# Patient Record
Sex: FEMALE | Race: WHITE | NOT HISPANIC OR LATINO | Employment: PART TIME | URBAN - METROPOLITAN AREA
[De-identification: names, ages, dates, MRNs, and addresses within clinical notes are randomized per-mention and may not be internally consistent; named-entity substitution may affect disease eponyms.]

---

## 2023-07-29 ENCOUNTER — HOSPITAL ENCOUNTER (OUTPATIENT)
Dept: DATA CONVERSION | Facility: HOSPITAL | Age: 33
Discharge: HOME | End: 2023-07-29
Attending: EMERGENCY MEDICINE

## 2023-07-29 DIAGNOSIS — R11.0 NAUSEA: ICD-10-CM

## 2023-07-29 DIAGNOSIS — Z88.2 ALLERGY STATUS TO SULFONAMIDES: ICD-10-CM

## 2023-07-29 DIAGNOSIS — I10 ESSENTIAL (PRIMARY) HYPERTENSION: ICD-10-CM

## 2023-07-29 DIAGNOSIS — R10.11 RIGHT UPPER QUADRANT PAIN: Primary | ICD-10-CM

## 2023-07-29 DIAGNOSIS — R00.0 TACHYCARDIA, UNSPECIFIED: ICD-10-CM

## 2023-07-29 LAB
ALBUMIN SERPL-MCNC: 3.8 GM/DL (ref 3.5–5)
ALBUMIN/GLOB SERPL: 1.2 RATIO (ref 1.5–3)
ALP BLD-CCNC: 112 U/L (ref 35–125)
ALT SERPL-CCNC: 21 U/L (ref 5–40)
ANION GAP SERPL CALCULATED.3IONS-SCNC: 10 MMOL/L (ref 0–19)
AST SERPL-CCNC: 18 U/L (ref 5–40)
BACTERIA SPEC CULT: NORMAL
BACTERIA UR QL AUTO: NEGATIVE
BASOPHILS # BLD AUTO: 0.07 K/UL (ref 0–0.22)
BASOPHILS NFR BLD AUTO: 0.6 % (ref 0–1)
BILIRUB SERPL-MCNC: 0.6 MG/DL (ref 0.1–1.2)
BILIRUB UR QL STRIP.AUTO: NEGATIVE
BUN SERPL-MCNC: 7 MG/DL (ref 8–25)
BUN/CREAT SERPL: 8.8 RATIO (ref 8–21)
CALCIUM SERPL-MCNC: 8.8 MG/DL (ref 8.5–10.4)
CC # UR: NORMAL /UL
CHLORIDE SERPL-SCNC: 102 MMOL/L (ref 97–107)
CLARITY UR: CLEAR
CO2 SERPL-SCNC: 27 MMOL/L (ref 24–31)
COLOR UR: ABNORMAL
CREAT SERPL-MCNC: 0.8 MG/DL (ref 0.4–1.6)
DEPRECATED RDW RBC AUTO: 42.5 FL (ref 37–54)
DIFFERENTIAL METHOD BLD: ABNORMAL
EOSINOPHIL # BLD AUTO: 0.25 K/UL (ref 0–0.45)
EOSINOPHIL NFR BLD: 2 % (ref 0–3)
ERYTHROCYTE [DISTWIDTH] IN BLOOD BY AUTOMATED COUNT: 12.5 % (ref 11.7–15)
GFR SERPL CREATININE-BSD FRML MDRD: 100 ML/MIN/1.73 M2
GLOBULIN SER-MCNC: 3.3 G/DL (ref 1.9–3.7)
GLUCOSE SERPL-MCNC: 108 MG/DL (ref 65–99)
GLUCOSE UR STRIP.AUTO-MCNC: NEGATIVE MG/DL
HCG UR QL: NEGATIVE
HCT VFR BLD AUTO: 40.2 % (ref 36–44)
HGB BLD-MCNC: 13.4 GM/DL (ref 12–15)
HGB UR QL STRIP.AUTO: ABNORMAL /HPF (ref 0–3)
HGB UR QL: NEGATIVE
HYALINE CASTS UR QL AUTO: 3 /LPF
IMM GRANULOCYTES # BLD AUTO: 0.07 K/UL (ref 0–0.1)
KETONES UR QL STRIP.AUTO: NEGATIVE
LEUKOCYTE ESTERASE UR QL STRIP.AUTO: ABNORMAL
LIPASE SERPL-CCNC: 19 U/L (ref 16–63)
LYMPHOCYTES # BLD AUTO: 2.89 K/UL (ref 1.2–3.2)
LYMPHOCYTES NFR BLD MANUAL: 23 % (ref 20–40)
MCH RBC QN AUTO: 31 PG (ref 26–34)
MCHC RBC AUTO-ENTMCNC: 33.3 % (ref 31–37)
MCV RBC AUTO: 93.1 FL (ref 80–100)
MICROSCOPIC (UA): ABNORMAL
MONOCYTES # BLD AUTO: 0.79 K/UL (ref 0–0.8)
MONOCYTES NFR BLD MANUAL: 6.3 % (ref 0–8)
NEUTROPHILS # BLD AUTO: 8.5 K/UL
NEUTROPHILS # BLD AUTO: 8.5 K/UL (ref 1.8–7.7)
NEUTROPHILS.IMMATURE NFR BLD: 0.6 % (ref 0–1)
NEUTS SEG NFR BLD: 67.5 % (ref 50–70)
NITRITE UR QL STRIP.AUTO: NEGATIVE
NRBC BLD-RTO: 0 /100 WBC
PH UR STRIP.AUTO: 6 [PH] (ref 4.6–8)
PLATELET # BLD AUTO: 358 K/UL (ref 150–450)
PMV BLD AUTO: 11 CU (ref 7–12.6)
POTASSIUM SERPL-SCNC: 4.2 MMOL/L (ref 3.4–5.1)
PROT SERPL-MCNC: 7.1 G/DL (ref 5.9–7.9)
PROT UR STRIP.AUTO-MCNC: NEGATIVE MG/DL
RBC # BLD AUTO: 4.32 M/UL (ref 4–4.9)
REPORT STATUS -LH SQ DATA CONVERSION: NORMAL
SERVICE CMNT-IMP: NORMAL
SODIUM SERPL-SCNC: 139 MMOL/L (ref 133–145)
SP GR UR STRIP.AUTO: 1.02 (ref 1–1.03)
SPECIMEN SOURCE: NORMAL
SQUAMOUS UR QL AUTO: ABNORMAL /HPF
URINE CULTURE: ABNORMAL
UROBILINOGEN UR QL STRIP.AUTO: NORMAL MG/DL (ref 0–1)
WBC # BLD AUTO: 12.6 K/UL (ref 4.5–11)
WBC #/AREA URNS AUTO: 3 /HPF (ref 0–3)

## 2023-10-30 PROCEDURE — 88141 CYTOPATH C/V INTERPRET: CPT | Performed by: PATHOLOGY

## 2023-10-30 PROCEDURE — 88175 CYTOPATH C/V AUTO FLUID REDO: CPT

## 2023-10-30 PROCEDURE — 87624 HPV HI-RISK TYP POOLED RSLT: CPT

## 2023-11-01 ENCOUNTER — LAB REQUISITION (OUTPATIENT)
Dept: LAB | Facility: HOSPITAL | Age: 33
End: 2023-11-01
Payer: COMMERCIAL

## 2023-11-01 ENCOUNTER — HOSPITAL ENCOUNTER (OUTPATIENT)
Dept: RADIOLOGY | Facility: HOSPITAL | Age: 33
Discharge: HOME | End: 2023-11-01
Payer: COMMERCIAL

## 2023-11-01 DIAGNOSIS — R87.810 CERVICAL HIGH RISK HUMAN PAPILLOMAVIRUS (HPV) DNA TEST POSITIVE: ICD-10-CM

## 2023-11-01 DIAGNOSIS — N92.6 IRREGULAR MENSTRUATION, UNSPECIFIED: ICD-10-CM

## 2023-11-01 DIAGNOSIS — Z01.419 ENCOUNTER FOR GYNECOLOGICAL EXAMINATION (GENERAL) (ROUTINE) WITHOUT ABNORMAL FINDINGS: ICD-10-CM

## 2023-11-01 DIAGNOSIS — Z87.42 PERSONAL HISTORY OF OTHER DISEASES OF THE FEMALE GENITAL TRACT: ICD-10-CM

## 2023-11-01 DIAGNOSIS — Z12.4 ENCOUNTER FOR SCREENING FOR MALIGNANT NEOPLASM OF CERVIX: ICD-10-CM

## 2023-11-01 DIAGNOSIS — Z11.51 ENCOUNTER FOR SCREENING FOR HUMAN PAPILLOMAVIRUS (HPV): ICD-10-CM

## 2023-11-01 DIAGNOSIS — R87.612 LOW GRADE SQUAMOUS INTRAEPITHELIAL LESION ON CYTOLOGIC SMEAR OF CERVIX (LGSIL): ICD-10-CM

## 2023-11-01 PROCEDURE — 74740 X-RAY FEMALE GENITAL TRACT: CPT

## 2023-11-01 PROCEDURE — 58340 CATHETER FOR HYSTEROGRAPHY: CPT

## 2023-11-01 PROCEDURE — 2550000001 HC RX 255 CONTRASTS: Performed by: STUDENT IN AN ORGANIZED HEALTH CARE EDUCATION/TRAINING PROGRAM

## 2023-11-01 RX ADMIN — IOHEXOL 15 ML: 240 INJECTION, SOLUTION INTRATHECAL; INTRAVASCULAR; INTRAVENOUS; ORAL at 09:52

## 2023-11-10 ENCOUNTER — LAB (OUTPATIENT)
Dept: LAB | Facility: LAB | Age: 33
End: 2023-11-10
Payer: COMMERCIAL

## 2023-11-10 DIAGNOSIS — N92.6 IRREGULAR MENSTRUATION, UNSPECIFIED: Primary | ICD-10-CM

## 2023-11-10 LAB — TSH SERPL DL<=0.05 MIU/L-ACNC: 3 MIU/L (ref 0.27–4.2)

## 2023-11-10 PROCEDURE — 82627 DEHYDROEPIANDROSTERONE: CPT

## 2023-11-10 PROCEDURE — 36415 COLL VENOUS BLD VENIPUNCTURE: CPT

## 2023-11-10 PROCEDURE — 83001 ASSAY OF GONADOTROPIN (FSH): CPT

## 2023-11-10 PROCEDURE — 84144 ASSAY OF PROGESTERONE: CPT

## 2023-11-10 PROCEDURE — 83002 ASSAY OF GONADOTROPIN (LH): CPT

## 2023-11-10 PROCEDURE — 84443 ASSAY THYROID STIM HORMONE: CPT

## 2023-11-10 PROCEDURE — 84146 ASSAY OF PROLACTIN: CPT

## 2023-11-10 PROCEDURE — 84402 ASSAY OF FREE TESTOSTERONE: CPT

## 2023-11-10 PROCEDURE — 82670 ASSAY OF TOTAL ESTRADIOL: CPT

## 2023-11-11 LAB
DHEA-S SERPL-MCNC: 177 UG/DL (ref 12–379)
ESTRADIOL SERPL-MCNC: 43 PG/ML
FSH SERPL-ACNC: 7.4 IU/L
LH SERPL-ACNC: 15.2 IU/L
PROGEST SERPL-MCNC: 0.4 NG/ML
PROLACTIN SERPL-MCNC: 14.5 UG/L (ref 3–20)

## 2023-11-15 LAB
CYTOLOGY CMNT CVX/VAG CYTO-IMP: NORMAL
HPV HR 12 DNA GENITAL QL NAA+PROBE: POSITIVE
HPV HR GENOTYPES PNL CVX NAA+PROBE: POSITIVE
HPV16 DNA SPEC QL NAA+PROBE: NEGATIVE
HPV18 DNA SPEC QL NAA+PROBE: NEGATIVE
LAB AP HPV GENOTYPE QUESTION: YES
LAB AP HPV HR: NORMAL
LAB AP PREVIOUS ABNORMAL HISTORY: NORMAL
LABORATORY COMMENT REPORT: NORMAL
LMP START DATE: NORMAL
PATH REPORT.TOTAL CANCER: NORMAL

## 2023-11-16 LAB
TESTOSTERONE FREE (CHAN): 6.9 PG/ML (ref 0.1–6.4)
TESTOSTERONE,TOTAL,LC-MS/MS: 29 NG/DL (ref 2–45)

## 2023-12-06 ENCOUNTER — TELEMEDICINE (OUTPATIENT)
Dept: PRIMARY CARE | Facility: CLINIC | Age: 33
End: 2023-12-06
Payer: COMMERCIAL

## 2023-12-06 VITALS — WEIGHT: 227 LBS | BODY MASS INDEX: 42.86 KG/M2 | HEIGHT: 61 IN

## 2023-12-06 DIAGNOSIS — U07.1 POSITIVE SELF-ADMINISTERED ANTIGEN TEST FOR COVID-19: Primary | ICD-10-CM

## 2023-12-06 PROCEDURE — 99213 OFFICE O/P EST LOW 20 MIN: CPT

## 2023-12-06 RX ORDER — TETRACYCLINE HYDROCHLORIDE 500 MG/1
500 CAPSULE ORAL 4 TIMES DAILY
COMMUNITY
Start: 2023-10-03 | End: 2023-12-06 | Stop reason: ALTCHOICE

## 2023-12-06 RX ORDER — CYCLOBENZAPRINE HCL 5 MG
5 TABLET ORAL EVERY 24 HOURS
COMMUNITY
Start: 2023-07-28 | End: 2023-12-06 | Stop reason: ALTCHOICE

## 2023-12-06 RX ORDER — FAMOTIDINE 20 MG/1
20 TABLET, FILM COATED ORAL DAILY
COMMUNITY
End: 2023-12-06 | Stop reason: ALTCHOICE

## 2023-12-06 RX ORDER — ONDANSETRON 4 MG/1
4 TABLET, ORALLY DISINTEGRATING ORAL EVERY 8 HOURS PRN
COMMUNITY

## 2023-12-06 RX ORDER — PHENAZOPYRIDINE HYDROCHLORIDE 200 MG/1
200 TABLET, FILM COATED ORAL 3 TIMES DAILY PRN
COMMUNITY
Start: 2023-07-14 | End: 2023-12-06 | Stop reason: ALTCHOICE

## 2023-12-06 RX ORDER — UBROGEPANT 50 MG/1
50 TABLET ORAL EVERY 24 HOURS
COMMUNITY
Start: 2021-07-27

## 2023-12-06 RX ORDER — NIRMATRELVIR AND RITONAVIR 300-100 MG
3 KIT ORAL 2 TIMES DAILY
Qty: 30 TABLET | Refills: 0 | Status: SHIPPED | OUTPATIENT
Start: 2023-12-06 | End: 2023-12-11

## 2023-12-06 RX ORDER — TRIAMCINOLONE ACETONIDE 1 MG/G
1 OINTMENT TOPICAL EVERY 12 HOURS
COMMUNITY
Start: 2021-11-05

## 2023-12-06 RX ORDER — TAMSULOSIN HYDROCHLORIDE 0.4 MG/1
1 CAPSULE ORAL EVERY 24 HOURS
COMMUNITY
Start: 2023-07-17 | End: 2023-12-06 | Stop reason: ALTCHOICE

## 2023-12-06 RX ORDER — AMLODIPINE BESYLATE 5 MG/1
5 TABLET ORAL DAILY
COMMUNITY

## 2023-12-06 RX ORDER — HYDROXYZINE HYDROCHLORIDE 10 MG/1
10 TABLET, FILM COATED ORAL AS NEEDED
COMMUNITY

## 2023-12-06 RX ORDER — OMEPRAZOLE 40 MG/1
40 CAPSULE, DELAYED RELEASE ORAL 2 TIMES DAILY
COMMUNITY
Start: 2023-11-01 | End: 2023-12-01 | Stop reason: WASHOUT

## 2023-12-06 ASSESSMENT — ENCOUNTER SYMPTOMS
NAUSEA: 0
CHILLS: 0
FATIGUE: 1
FEVER: 0
WHEEZING: 1
COUGH: 1
SHORTNESS OF BREATH: 0
MYALGIAS: 0
SINUS PRESSURE: 0
DIARRHEA: 0
SINUS PAIN: 0
SORE THROAT: 1
RHINORRHEA: 0

## 2023-12-06 ASSESSMENT — PATIENT HEALTH QUESTIONNAIRE - PHQ9
SUM OF ALL RESPONSES TO PHQ9 QUESTIONS 1 AND 2: 0
2. FEELING DOWN, DEPRESSED OR HOPELESS: NOT AT ALL
1. LITTLE INTEREST OR PLEASURE IN DOING THINGS: NOT AT ALL

## 2023-12-06 ASSESSMENT — PAIN SCALES - GENERAL: PAINLEVEL: 0-NO PAIN

## 2023-12-06 NOTE — PROGRESS NOTES
"Subjective   Patient ID: Sandrita Brady is a 33 y.o. female who presents for No chief complaint on file..    With patient's permission, this is a Telemedicine visit with video and audio. All issues as documented below were discussed and addressed but limited physical exam was performed. If it was felt that the patient should be evaluated via face-to-face office appointment(s) they were directed there.       Hx of HTN, JEANETTE, migraines, GERD    +Covid test 12/2/23, symptoms started 12/1/2023. Symptoms include cough, chest pain, sneezing, fatigue, no taste/smell, scratchy throat, mild wheezing, nasal congestion.  (7/2023). Denies fever, chills, ear pain, sinus pressure, rhinorrhea, SOB, diarrhea, nausea, myalgias.          Review of Systems   Constitutional:  Positive for fatigue. Negative for chills and fever.   HENT:  Positive for congestion, sneezing and sore throat. Negative for ear pain, rhinorrhea, sinus pressure and sinus pain.    Respiratory:  Positive for cough and wheezing. Negative for shortness of breath.    Cardiovascular:  Positive for chest pain.   Gastrointestinal:  Negative for diarrhea and nausea.   Musculoskeletal:  Negative for myalgias.       Objective   Ht 1.549 m (5' 1\")   Wt 103 kg (227 lb)   BMI 42.89 kg/m²     Physical Exam  Constitutional:       Appearance: Normal appearance.   Pulmonary:      Effort: Pulmonary effort is normal.   Skin:     Findings: No rash.   Neurological:      Mental Status: She is alert.   Psychiatric:         Mood and Affect: Mood and affect normal.         Assessment/Plan   Diagnoses and all orders for this visit:  Positive self-administered antigen test for COVID-19  -Cut Amlodipine in half while on Paxlovid. Patient currently not taking Ulbrevy., informed not to take within 24 hours of taking Paxlovid.      "

## 2023-12-06 NOTE — LETTER
December 6, 2023     Patient: Sandrita Brady   YOB: 1990   Date of Visit: 12/6/2023       To Whom It May Concern:    Sandrita Brady was seen in my clinic on 12/6/2023 at 8:30 am. Please excuse Sandrita for her absence from 12/02/2023 to 12/08/2023 due to acute illness. Can return to work on 12/9/2023.    If you have any questions or concerns, please don't hesitate to call.         Sincerely,     Bri Cagle PA-C

## 2023-12-13 PROCEDURE — 88305 TISSUE EXAM BY PATHOLOGIST: CPT

## 2023-12-13 PROCEDURE — 88305 TISSUE EXAM BY PATHOLOGIST: CPT | Performed by: PATHOLOGY

## 2023-12-14 ENCOUNTER — LAB REQUISITION (OUTPATIENT)
Dept: LAB | Facility: HOSPITAL | Age: 33
End: 2023-12-14
Payer: COMMERCIAL

## 2023-12-14 DIAGNOSIS — R87.612 LOW GRADE SQUAMOUS INTRAEPITHELIAL LESION ON CYTOLOGIC SMEAR OF CERVIX (LGSIL): ICD-10-CM

## 2023-12-15 LAB
LABORATORY COMMENT REPORT: NORMAL
PATH REPORT.FINAL DX SPEC: NORMAL
PATH REPORT.GROSS SPEC: NORMAL
PATH REPORT.RELEVANT HX SPEC: NORMAL
PATH REPORT.TOTAL CANCER: NORMAL

## 2024-02-26 ASSESSMENT — LIFESTYLE VARIABLES
HISTORY_ALCOHOL_USE: YES
TOBACCO_USE: NO

## 2024-03-07 ENCOUNTER — CONSULT (OUTPATIENT)
Dept: ENDOCRINOLOGY | Facility: CLINIC | Age: 34
End: 2024-03-07
Payer: COMMERCIAL

## 2024-03-07 ENCOUNTER — ANCILLARY PROCEDURE (OUTPATIENT)
Dept: ENDOCRINOLOGY | Facility: CLINIC | Age: 34
End: 2024-03-07
Payer: COMMERCIAL

## 2024-03-07 VITALS
BODY MASS INDEX: 43.8 KG/M2 | WEIGHT: 232 LBS | DIASTOLIC BLOOD PRESSURE: 89 MMHG | HEIGHT: 61 IN | SYSTOLIC BLOOD PRESSURE: 133 MMHG | TEMPERATURE: 98.6 F | HEART RATE: 80 BPM

## 2024-03-07 DIAGNOSIS — N93.9 ABNORMAL UTERINE BLEEDING (AUB): ICD-10-CM

## 2024-03-07 DIAGNOSIS — R10.2 PELVIC PAIN: Primary | ICD-10-CM

## 2024-03-07 DIAGNOSIS — N70.11 HYDROSALPINX: ICD-10-CM

## 2024-03-07 DIAGNOSIS — E28.2 PCOS (POLYCYSTIC OVARIAN SYNDROME): ICD-10-CM

## 2024-03-07 DIAGNOSIS — R10.2 PELVIC PAIN: ICD-10-CM

## 2024-03-07 PROCEDURE — 83520 IMMUNOASSAY QUANT NOS NONAB: CPT | Performed by: OBSTETRICS & GYNECOLOGY

## 2024-03-07 PROCEDURE — 83516 IMMUNOASSAY NONANTIBODY: CPT

## 2024-03-07 PROCEDURE — 36415 COLL VENOUS BLD VENIPUNCTURE: CPT | Performed by: OBSTETRICS & GYNECOLOGY

## 2024-03-07 PROCEDURE — 76830 TRANSVAGINAL US NON-OB: CPT

## 2024-03-07 PROCEDURE — 99205 OFFICE O/P NEW HI 60 MIN: CPT | Performed by: OBSTETRICS & GYNECOLOGY

## 2024-03-07 PROCEDURE — 99215 OFFICE O/P EST HI 40 MIN: CPT | Performed by: OBSTETRICS & GYNECOLOGY

## 2024-03-07 PROCEDURE — 36415 COLL VENOUS BLD VENIPUNCTURE: CPT

## 2024-03-07 RX ORDER — OMEPRAZOLE 40 MG/1
40 CAPSULE, DELAYED RELEASE ORAL
COMMUNITY

## 2024-03-07 RX ORDER — NIFEDIPINE 10 MG/1
10 CAPSULE ORAL 3 TIMES DAILY
COMMUNITY

## 2024-03-07 ASSESSMENT — PAIN SCALES - GENERAL: PAINLEVEL: 2

## 2024-03-07 ASSESSMENT — COLUMBIA-SUICIDE SEVERITY RATING SCALE - C-SSRS
2. HAVE YOU ACTUALLY HAD ANY THOUGHTS OF KILLING YOURSELF?: NO
1. IN THE PAST MONTH, HAVE YOU WISHED YOU WERE DEAD OR WISHED YOU COULD GO TO SLEEP AND NOT WAKE UP?: NO
6. HAVE YOU EVER DONE ANYTHING, STARTED TO DO ANYTHING, OR PREPARED TO DO ANYTHING TO END YOUR LIFE?: NO

## 2024-03-07 ASSESSMENT — PATIENT HEALTH QUESTIONNAIRE - PHQ9
2. FEELING DOWN, DEPRESSED OR HOPELESS: NOT AT ALL
1. LITTLE INTEREST OR PLEASURE IN DOING THINGS: NOT AT ALL
SUM OF ALL RESPONSES TO PHQ9 QUESTIONS 1 AND 2: 0

## 2024-03-07 NOTE — PROGRESS NOTES
Visit Type: In Person    GYN NOTE    2024    Consult from:  Dr Salguero, here today with long term partner Devyn    History of present illness: Sandrita Brady is a 33 y.o. female   who presents with concerns regarding hydrosalpinges. + AUB and pelvic pain, intermenstrual spotting.   Found out in 2018 that her tubes were blocked- she was with an ex at the time and had untreated CT, admitted with PID. She had IV abx and pain meds. Hospitalized for 4 days.   Prior to that she had irregular periods and was not sure whether endometriosis or PCOS. Her mother had endometriosis and had a hysterectomy. She couldn't take OCPs because they made her nauseated and contributed to weight gain.   She is an  and has some skin irritation - gained 70 lbs, wasn't able to keep weight gain.   She did have a TSH 4 years ago = 4.95. TSH 3 months ago was 3.   She does have some pelvic pain occasionally.     10.30.23  Narrative & Impression   Interpreted By:  Jonny Rankin,   STUDY:  [place] HYSTEROSALPINGOGRAM;  2023 9:44 am      INDICATION:  Signs/Symptoms:IRREGULAR PERIODS. 33-year-old woman with irregular  menstrual cycles, infertility workup.      COMPARISON:  CT abdomen and pelvis 2023      ACCESSION NUMBER(S):  [id number]      ORDERING CLINICIAN:  DEVIKA SALGUERO      TECHNIQUE:  Written informed consent was obtained from the patient after  discussion of the risks, benefits, and alternatives to this  procedure. Specifically, the risk of infection or endangerment of any  current pregnancy were discussed. The patient states she is on day 7  of her most recent menstrual cycle. An HSG was performed.  Approximately 10 mL of contrast was infused under fluoroscopic  guidance and spot views of the uterus and bilateral fallopian tubes  were obtained. The patient tolerated the procedure well.      Dose: 75.34 mGy air kerma  Images: 10      FINDINGS:  Contrast filled and unremarkable appearing uterus,  allowing for  impression of the balloon catheter. There is contrast opacification  of the bilateral fallopian tubes. Of note, there is left-sided  hydrosalpinx. It is difficult to discern for direct spillage of  contrast from the left fallopian tube. Possibly, there is a small  amount of contrast spillage around the left fallopian tube. Towards  the end of the study, there was suspicion for contrast opacification  of the left ovarian vein, denoted by tubular structure directed  superiorly.      Contrast fills the right fallopian tube, but there is no spillage  seen into the peritoneal cavity to suggest patency.      IMPRESSION:  1. Left-sided hydrosalpinx, making it difficult to discern for direct  spillage of contrast from the left fallopian tube. Possibly there is  a small amount of contrast spillage, but morphologically, the left  fallopian tube would be abnormal due to hydrosalpinx. Therefore,  ultrasound of the pelvis is recommended for further characterization.      2. No sign of contrast spillage from the right fallopian tube.  Therefore, right fallopian tube patency was not seen by HSG.       OBSTETRIC HISTORY     OB History          0    Para   0    Term   0       0    AB   0    Living   0         SAB   0    IAB   0    Ectopic   0    Multiple   0    Live Births   0                 MEDICAL HISTORY  History reviewed. No pertinent past medical history.    SURGICAL HISTORY   History reviewed. No pertinent surgical history.    SOCIAL HISTORY  Occupation:   Social History     Tobacco Use    Smoking status: Never     Passive exposure: Never    Smokeless tobacco: Never   Substance Use Topics    Alcohol use: Yes    Drug use: Never         MEDS  Current Outpatient Medications on File Prior to Visit   Medication Sig Dispense Refill    hydrOXYzine HCL (Atarax) 10 mg tablet Take 1 tablet (10 mg) by mouth if needed for anxiety.      NIFEdipine (Procardia) 10 mg capsule Take 1 capsule (10 mg) by  "mouth 3 times a day.      omeprazole (PriLOSEC) 40 mg DR capsule Take 1 capsule (40 mg) by mouth. Do not crush or chew.      triamcinolone (Kenalog) 0.1 % ointment Apply 1 Application topically every 12 hours.      ubrogepant (Ubrelvy) 50 mg tablet Take 1 tablet (50 mg) by mouth once every 24 hours.      amLODIPine (Norvasc) 5 mg tablet Take 1 tablet (5 mg) by mouth once daily.      ondansetron ODT (Zofran-ODT) 4 mg disintegrating tablet Take 1 tablet (4 mg) by mouth every 8 hours if needed for nausea or vomiting.       No current facility-administered medications on file prior to visit.       ALLERGIES  Albuterol, Codeine, Naproxen, and Sulfa (sulfonamide antibiotics)    Vitals: /89   Pulse 80   Temp 37 °C (98.6 °F)   Ht 1.549 m (5' 1\")   Wt 105 kg (232 lb)   LMP 02/05/2024 (Exact Date)   BMI 43.84 kg/m²   Physical Exam  General: NAD  CV: RRR  Lungs: CTAB  Abd: soft, NT, no masses  Pelvic: deferred  Ext: NT, no edema  US performed see results tab- positive sliding sign, no evidence of endo, left ovary appeared PCO    Prior labs:   CBC  Date: 7/29/2023  Plt: 358 (Ref range: 150 - 450 K/UL)  Hct: 40.2 (Ref range: 36 - 44 %)    CMP  Date: 7/29/2023   BUN: 7 (L; Ref range: 8 - 25 MG/DL)  Cre: 0.8 (Ref range: 0.4 - 1.6 MG/DL)  AST: 18 (Ref range: 5 - 40 U/L)  ALT: 21 (Ref range: 5 - 40 U/L)    Coagulation:  Date: No results found for requested labs within last 365 days.  PROTIME: No results found for requested labs within last 365 days.  INR: No results found for requested labs within last 365 days.  APTT: No results found for requested labs within last 365 days.    Last Pregnancy Test:  Date: No results found for requested labs within last 365 days.  No results found for requested labs within last 365 days.    Partner:  Tristian Ann  3.29.[birth year]  Together x 8 months (friends for 7 years)  PMH: None  PSH: None  No prior semen analysis  No pregnancy attempts previously   Not taking Testosterone  No family hx " (maternal aunt was infertile, maternal aunt with SABs)    Assessment  Sandrita Brady is a 33 y.o. female,   with hx PID and bilateral obstructed hydrosalpinx, pelvic pain, AUB    Plan  Counseling:   The patient's history and relevant imaging were discussed at length, and all questions were answered. The diagnosis of hydrosalpinx was discussed at length, and the patient understands that hydrosalpinx is typically the result of a prior inflammatory process that occurred within the pelvis (such as prior infection, prior surgery, or endometriosis).  The fertility impact of hydrosalpinx was discussed with the patient as well as the association of hydrosalpinges with a decreased pregnancy rate of up to 30-50%.  Therefore, surgical management of hydrosalpinx is recommended. If both fallopian tubes are removed, then IVF with embryo transfer is the only option for achieving pregnancy.   Orders Placed This Encounter   Procedures    US pelvis transvaginal    Antimullerian Hormone (Amh)     Orders placed for outpatient surgery. RBA discussed. She desires to proceed. We also discussed grants for future fertility.   Josey Benedict  2024  10:21 AM

## 2024-03-08 ENCOUNTER — PREP FOR PROCEDURE (OUTPATIENT)
Dept: ENDOCRINOLOGY | Facility: CLINIC | Age: 34
End: 2024-03-08
Payer: COMMERCIAL

## 2024-03-08 DIAGNOSIS — G89.29 CHRONIC PELVIC PAIN IN FEMALE: ICD-10-CM

## 2024-03-08 DIAGNOSIS — Z01.818 PRE-PROCEDURAL EXAMINATION: ICD-10-CM

## 2024-03-08 DIAGNOSIS — N93.9 ABNORMAL UTERINE BLEEDING (AUB): ICD-10-CM

## 2024-03-08 DIAGNOSIS — R10.2 CHRONIC PELVIC PAIN IN FEMALE: ICD-10-CM

## 2024-03-08 DIAGNOSIS — N70.11 HYDROSALPINX: Primary | ICD-10-CM

## 2024-03-08 RX ORDER — GABAPENTIN 600 MG/1
600 TABLET ORAL ONCE
Status: CANCELLED | OUTPATIENT
Start: 2024-03-08 | End: 2024-03-08

## 2024-03-08 RX ORDER — ACETAMINOPHEN 325 MG/1
975 TABLET ORAL ONCE
Status: CANCELLED | OUTPATIENT
Start: 2024-03-08 | End: 2024-03-08

## 2024-03-10 LAB — MIS SERPL-MCNC: 12.83 NG/ML (ref 0.18–11.71)

## 2024-03-11 PROBLEM — G89.29 CHRONIC PELVIC PAIN IN FEMALE: Status: ACTIVE | Noted: 2024-03-08

## 2024-03-11 PROBLEM — R10.2 CHRONIC PELVIC PAIN IN FEMALE: Status: ACTIVE | Noted: 2024-03-08

## 2024-03-11 PROBLEM — N93.9 ABNORMAL UTERINE BLEEDING (AUB): Status: ACTIVE | Noted: 2024-03-08

## 2024-03-11 PROBLEM — N70.11 HYDROSALPINX: Status: ACTIVE | Noted: 2024-03-08

## 2024-04-10 ENCOUNTER — APPOINTMENT (OUTPATIENT)
Dept: ENDOCRINOLOGY | Facility: CLINIC | Age: 34
End: 2024-04-10
Payer: COMMERCIAL

## 2024-04-17 ENCOUNTER — LAB (OUTPATIENT)
Dept: LAB | Facility: LAB | Age: 34
End: 2024-04-17
Payer: COMMERCIAL

## 2024-04-17 DIAGNOSIS — Z01.818 PRE-PROCEDURAL EXAMINATION: ICD-10-CM

## 2024-04-17 LAB
ABO GROUP (TYPE) IN BLOOD: NORMAL
ANION GAP SERPL CALC-SCNC: 10 MMOL/L (ref 10–20)
ANTIBODY SCREEN: NORMAL
BUN SERPL-MCNC: 11 MG/DL (ref 6–23)
CALCIUM SERPL-MCNC: 9.2 MG/DL (ref 8.6–10.3)
CHLORIDE SERPL-SCNC: 101 MMOL/L (ref 98–107)
CO2 SERPL-SCNC: 31 MMOL/L (ref 21–32)
CREAT SERPL-MCNC: 0.86 MG/DL (ref 0.5–1.05)
EGFRCR SERPLBLD CKD-EPI 2021: >90 ML/MIN/1.73M*2
ERYTHROCYTE [DISTWIDTH] IN BLOOD BY AUTOMATED COUNT: 12.3 % (ref 11.5–14.5)
GLUCOSE SERPL-MCNC: 102 MG/DL (ref 74–99)
HCG UR QL IA.RAPID: NEGATIVE
HCT VFR BLD AUTO: 40.5 % (ref 36–46)
HGB BLD-MCNC: 13.3 G/DL (ref 12–16)
MCH RBC QN AUTO: 29.2 PG (ref 26–34)
MCHC RBC AUTO-ENTMCNC: 32.8 G/DL (ref 32–36)
MCV RBC AUTO: 89 FL (ref 80–100)
NRBC BLD-RTO: 0 /100 WBCS (ref 0–0)
PLATELET # BLD AUTO: 296 X10*3/UL (ref 150–450)
POTASSIUM SERPL-SCNC: 4.3 MMOL/L (ref 3.5–5.3)
RBC # BLD AUTO: 4.56 X10*6/UL (ref 4–5.2)
RH FACTOR (ANTIGEN D): NORMAL
SODIUM SERPL-SCNC: 138 MMOL/L (ref 136–145)
WBC # BLD AUTO: 10.7 X10*3/UL (ref 4.4–11.3)

## 2024-04-17 PROCEDURE — 36415 COLL VENOUS BLD VENIPUNCTURE: CPT

## 2024-04-17 PROCEDURE — 81025 URINE PREGNANCY TEST: CPT

## 2024-04-17 PROCEDURE — 86900 BLOOD TYPING SEROLOGIC ABO: CPT

## 2024-04-17 PROCEDURE — 86901 BLOOD TYPING SEROLOGIC RH(D): CPT

## 2024-04-17 PROCEDURE — 86850 RBC ANTIBODY SCREEN: CPT

## 2024-04-17 PROCEDURE — 85027 COMPLETE CBC AUTOMATED: CPT

## 2024-04-17 PROCEDURE — 80048 BASIC METABOLIC PNL TOTAL CA: CPT

## 2024-04-19 ENCOUNTER — ANESTHESIA (OUTPATIENT)
Dept: OPERATING ROOM | Facility: HOSPITAL | Age: 34
End: 2024-04-19
Payer: COMMERCIAL

## 2024-04-19 ENCOUNTER — HOSPITAL ENCOUNTER (OUTPATIENT)
Facility: HOSPITAL | Age: 34
Setting detail: OUTPATIENT SURGERY
Discharge: HOME | End: 2024-04-19
Attending: OBSTETRICS & GYNECOLOGY | Admitting: OBSTETRICS & GYNECOLOGY
Payer: COMMERCIAL

## 2024-04-19 ENCOUNTER — ANESTHESIA EVENT (OUTPATIENT)
Dept: OPERATING ROOM | Facility: HOSPITAL | Age: 34
End: 2024-04-19
Payer: COMMERCIAL

## 2024-04-19 VITALS
OXYGEN SATURATION: 96 % | TEMPERATURE: 97.3 F | BODY MASS INDEX: 44.54 KG/M2 | DIASTOLIC BLOOD PRESSURE: 91 MMHG | WEIGHT: 235.89 LBS | SYSTOLIC BLOOD PRESSURE: 150 MMHG | RESPIRATION RATE: 16 BRPM | HEART RATE: 74 BPM | HEIGHT: 61 IN

## 2024-04-19 DIAGNOSIS — N93.9 ABNORMAL UTERINE BLEEDING (AUB): ICD-10-CM

## 2024-04-19 DIAGNOSIS — R10.2 CHRONIC PELVIC PAIN IN FEMALE: ICD-10-CM

## 2024-04-19 DIAGNOSIS — N70.11 HYDROSALPINX: ICD-10-CM

## 2024-04-19 DIAGNOSIS — Z98.890 POST-OPERATIVE STATE: Primary | ICD-10-CM

## 2024-04-19 DIAGNOSIS — G89.29 CHRONIC PELVIC PAIN IN FEMALE: ICD-10-CM

## 2024-04-19 LAB
ABO GROUP (TYPE) IN BLOOD: NORMAL
PREGNANCY TEST URINE, POC: NEGATIVE
RH FACTOR (ANTIGEN D): NORMAL

## 2024-04-19 PROCEDURE — 2500000005 HC RX 250 GENERAL PHARMACY W/O HCPCS: Performed by: OBSTETRICS & GYNECOLOGY

## 2024-04-19 PROCEDURE — 2500000001 HC RX 250 WO HCPCS SELF ADMINISTERED DRUGS (ALT 637 FOR MEDICARE OP): Performed by: STUDENT IN AN ORGANIZED HEALTH CARE EDUCATION/TRAINING PROGRAM

## 2024-04-19 PROCEDURE — A4216 STERILE WATER/SALINE, 10 ML: HCPCS | Performed by: OBSTETRICS & GYNECOLOGY

## 2024-04-19 PROCEDURE — A4217 STERILE WATER/SALINE, 500 ML: HCPCS | Performed by: OBSTETRICS & GYNECOLOGY

## 2024-04-19 PROCEDURE — 7100000010 HC PHASE TWO TIME - EACH INCREMENTAL 1 MINUTE: Performed by: OBSTETRICS & GYNECOLOGY

## 2024-04-19 PROCEDURE — 2500000005 HC RX 250 GENERAL PHARMACY W/O HCPCS: Performed by: STUDENT IN AN ORGANIZED HEALTH CARE EDUCATION/TRAINING PROGRAM

## 2024-04-19 PROCEDURE — A58660 PR LAP,LYSIS OF ADHESIONS: Performed by: STUDENT IN AN ORGANIZED HEALTH CARE EDUCATION/TRAINING PROGRAM

## 2024-04-19 PROCEDURE — 2500000005 HC RX 250 GENERAL PHARMACY W/O HCPCS: Performed by: ANESTHESIOLOGIST ASSISTANT

## 2024-04-19 PROCEDURE — 58350 REOPEN FALLOPIAN TUBE: CPT | Performed by: OBSTETRICS & GYNECOLOGY

## 2024-04-19 PROCEDURE — A58660 PR LAP,LYSIS OF ADHESIONS: Performed by: ANESTHESIOLOGIST ASSISTANT

## 2024-04-19 PROCEDURE — 7100000002 HC RECOVERY ROOM TIME - EACH INCREMENTAL 1 MINUTE: Performed by: OBSTETRICS & GYNECOLOGY

## 2024-04-19 PROCEDURE — 88305 TISSUE EXAM BY PATHOLOGIST: CPT | Mod: TC,AHULAB | Performed by: OBSTETRICS & GYNECOLOGY

## 2024-04-19 PROCEDURE — 2500000001 HC RX 250 WO HCPCS SELF ADMINISTERED DRUGS (ALT 637 FOR MEDICARE OP): Performed by: OBSTETRICS & GYNECOLOGY

## 2024-04-19 PROCEDURE — 7100000009 HC PHASE TWO TIME - INITIAL BASE CHARGE: Performed by: OBSTETRICS & GYNECOLOGY

## 2024-04-19 PROCEDURE — 3600000009 HC OR TIME - EACH INCREMENTAL 1 MINUTE - PROCEDURE LEVEL FOUR: Performed by: OBSTETRICS & GYNECOLOGY

## 2024-04-19 PROCEDURE — 58558 HYSTEROSCOPY BIOPSY: CPT | Performed by: OBSTETRICS & GYNECOLOGY

## 2024-04-19 PROCEDURE — 2500000004 HC RX 250 GENERAL PHARMACY W/ HCPCS (ALT 636 FOR OP/ED): Performed by: ANESTHESIOLOGIST ASSISTANT

## 2024-04-19 PROCEDURE — C1765 ADHESION BARRIER: HCPCS | Performed by: OBSTETRICS & GYNECOLOGY

## 2024-04-19 PROCEDURE — 2720000007 HC OR 272 NO HCPCS: Performed by: OBSTETRICS & GYNECOLOGY

## 2024-04-19 PROCEDURE — 88304 TISSUE EXAM BY PATHOLOGIST: CPT | Performed by: PATHOLOGY

## 2024-04-19 PROCEDURE — 3700000001 HC GENERAL ANESTHESIA TIME - INITIAL BASE CHARGE: Performed by: OBSTETRICS & GYNECOLOGY

## 2024-04-19 PROCEDURE — 2500000004 HC RX 250 GENERAL PHARMACY W/ HCPCS (ALT 636 FOR OP/ED): Performed by: OBSTETRICS & GYNECOLOGY

## 2024-04-19 PROCEDURE — 7100000001 HC RECOVERY ROOM TIME - INITIAL BASE CHARGE: Performed by: OBSTETRICS & GYNECOLOGY

## 2024-04-19 PROCEDURE — 58661 LAPAROSCOPY REMOVE ADNEXA: CPT | Performed by: OBSTETRICS & GYNECOLOGY

## 2024-04-19 PROCEDURE — 88305 TISSUE EXAM BY PATHOLOGIST: CPT | Performed by: PATHOLOGY

## 2024-04-19 PROCEDURE — 2780000003 HC OR 278 NO HCPCS: Performed by: OBSTETRICS & GYNECOLOGY

## 2024-04-19 PROCEDURE — 3600000004 HC OR TIME - INITIAL BASE CHARGE - PROCEDURE LEVEL FOUR: Performed by: OBSTETRICS & GYNECOLOGY

## 2024-04-19 PROCEDURE — 81025 URINE PREGNANCY TEST: CPT | Performed by: OBSTETRICS & GYNECOLOGY

## 2024-04-19 PROCEDURE — 36415 COLL VENOUS BLD VENIPUNCTURE: CPT | Performed by: OBSTETRICS & GYNECOLOGY

## 2024-04-19 PROCEDURE — 3700000002 HC GENERAL ANESTHESIA TIME - EACH INCREMENTAL 1 MINUTE: Performed by: OBSTETRICS & GYNECOLOGY

## 2024-04-19 RX ORDER — GABAPENTIN 300 MG/1
600 CAPSULE ORAL ONCE
Status: COMPLETED | OUTPATIENT
Start: 2024-04-19 | End: 2024-04-19

## 2024-04-19 RX ORDER — ROCURONIUM BROMIDE 10 MG/ML
INJECTION, SOLUTION INTRAVENOUS AS NEEDED
Status: DISCONTINUED | OUTPATIENT
Start: 2024-04-19 | End: 2024-04-19

## 2024-04-19 RX ORDER — OXYCODONE HYDROCHLORIDE 5 MG/1
5 TABLET ORAL EVERY 6 HOURS PRN
Qty: 10 TABLET | Refills: 0 | Status: SHIPPED | OUTPATIENT
Start: 2024-04-19

## 2024-04-19 RX ORDER — BUPIVACAINE HYDROCHLORIDE 2.5 MG/ML
INJECTION, SOLUTION INFILTRATION; PERINEURAL AS NEEDED
Status: DISCONTINUED | OUTPATIENT
Start: 2024-04-19 | End: 2024-04-19 | Stop reason: HOSPADM

## 2024-04-19 RX ORDER — DIPHENHYDRAMINE HYDROCHLORIDE 50 MG/ML
12.5 INJECTION INTRAMUSCULAR; INTRAVENOUS ONCE AS NEEDED
Status: DISCONTINUED | OUTPATIENT
Start: 2024-04-19 | End: 2024-04-19 | Stop reason: HOSPADM

## 2024-04-19 RX ORDER — SODIUM CHLORIDE, SODIUM LACTATE, POTASSIUM CHLORIDE, CALCIUM CHLORIDE 600; 310; 30; 20 MG/100ML; MG/100ML; MG/100ML; MG/100ML
INJECTION, SOLUTION INTRAVENOUS CONTINUOUS PRN
Status: DISCONTINUED | OUTPATIENT
Start: 2024-04-19 | End: 2024-04-19

## 2024-04-19 RX ORDER — MIDAZOLAM HYDROCHLORIDE 1 MG/ML
INJECTION INTRAMUSCULAR; INTRAVENOUS AS NEEDED
Status: DISCONTINUED | OUTPATIENT
Start: 2024-04-19 | End: 2024-04-19

## 2024-04-19 RX ORDER — METHOCARBAMOL 100 MG/ML
INJECTION, SOLUTION INTRAMUSCULAR; INTRAVENOUS AS NEEDED
Status: DISCONTINUED | OUTPATIENT
Start: 2024-04-19 | End: 2024-04-19

## 2024-04-19 RX ORDER — ONDANSETRON 4 MG/1
4 TABLET, FILM COATED ORAL EVERY 6 HOURS PRN
Qty: 20 TABLET | Refills: 0 | Status: SHIPPED | OUTPATIENT
Start: 2024-04-19

## 2024-04-19 RX ORDER — LABETALOL HYDROCHLORIDE 5 MG/ML
5 INJECTION, SOLUTION INTRAVENOUS ONCE AS NEEDED
Status: DISCONTINUED | OUTPATIENT
Start: 2024-04-19 | End: 2024-04-19 | Stop reason: HOSPADM

## 2024-04-19 RX ORDER — LIDOCAINE HYDROCHLORIDE 20 MG/ML
INJECTION, SOLUTION INFILTRATION; PERINEURAL AS NEEDED
Status: DISCONTINUED | OUTPATIENT
Start: 2024-04-19 | End: 2024-04-19

## 2024-04-19 RX ORDER — ONDANSETRON HYDROCHLORIDE 2 MG/ML
4 INJECTION, SOLUTION INTRAVENOUS ONCE AS NEEDED
Status: DISCONTINUED | OUTPATIENT
Start: 2024-04-19 | End: 2024-04-19 | Stop reason: HOSPADM

## 2024-04-19 RX ORDER — ONDANSETRON HYDROCHLORIDE 2 MG/ML
INJECTION, SOLUTION INTRAVENOUS AS NEEDED
Status: DISCONTINUED | OUTPATIENT
Start: 2024-04-19 | End: 2024-04-19

## 2024-04-19 RX ORDER — SODIUM CHLORIDE 0.9 G/100ML
IRRIGANT IRRIGATION AS NEEDED
Status: DISCONTINUED | OUTPATIENT
Start: 2024-04-19 | End: 2024-04-19 | Stop reason: HOSPADM

## 2024-04-19 RX ORDER — CEFAZOLIN 1 G/1
INJECTION, POWDER, FOR SOLUTION INTRAVENOUS AS NEEDED
Status: DISCONTINUED | OUTPATIENT
Start: 2024-04-19 | End: 2024-04-19

## 2024-04-19 RX ORDER — POLYETHYLENE GLYCOL 3350 17 G/17G
17 POWDER, FOR SOLUTION ORAL DAILY PRN
Qty: 10 PACKET | Refills: 0 | Status: SHIPPED | OUTPATIENT
Start: 2024-04-19

## 2024-04-19 RX ORDER — LIDOCAINE HYDROCHLORIDE 10 MG/ML
0.1 INJECTION, SOLUTION EPIDURAL; INFILTRATION; INTRACAUDAL; PERINEURAL ONCE
Status: DISCONTINUED | OUTPATIENT
Start: 2024-04-19 | End: 2024-04-19 | Stop reason: HOSPADM

## 2024-04-19 RX ORDER — FENTANYL CITRATE 50 UG/ML
INJECTION, SOLUTION INTRAMUSCULAR; INTRAVENOUS AS NEEDED
Status: DISCONTINUED | OUTPATIENT
Start: 2024-04-19 | End: 2024-04-19

## 2024-04-19 RX ORDER — ACETAMINOPHEN 325 MG/1
975 TABLET ORAL ONCE
Status: COMPLETED | OUTPATIENT
Start: 2024-04-19 | End: 2024-04-19

## 2024-04-19 RX ORDER — PROPOFOL 10 MG/ML
INJECTION, EMULSION INTRAVENOUS AS NEEDED
Status: DISCONTINUED | OUTPATIENT
Start: 2024-04-19 | End: 2024-04-19

## 2024-04-19 RX ORDER — ACETAMINOPHEN 325 MG/1
650 TABLET ORAL EVERY 6 HOURS PRN
Qty: 20 TABLET | Refills: 0 | Status: SHIPPED | OUTPATIENT
Start: 2024-04-19

## 2024-04-19 RX ORDER — SODIUM CHLORIDE, SODIUM LACTATE, POTASSIUM CHLORIDE, CALCIUM CHLORIDE 600; 310; 30; 20 MG/100ML; MG/100ML; MG/100ML; MG/100ML
100 INJECTION, SOLUTION INTRAVENOUS CONTINUOUS
Status: DISCONTINUED | OUTPATIENT
Start: 2024-04-19 | End: 2024-04-19 | Stop reason: HOSPADM

## 2024-04-19 RX ORDER — ONDANSETRON 4 MG/1
8 TABLET, ORALLY DISINTEGRATING ORAL ONCE
Status: COMPLETED | OUTPATIENT
Start: 2024-04-19 | End: 2024-04-19

## 2024-04-19 RX ORDER — IBUPROFEN 600 MG/1
600 TABLET ORAL EVERY 6 HOURS PRN
Qty: 20 TABLET | Refills: 0 | Status: SHIPPED | OUTPATIENT
Start: 2024-04-19

## 2024-04-19 RX ORDER — OXYCODONE HYDROCHLORIDE 5 MG/1
5 TABLET ORAL EVERY 4 HOURS PRN
Status: DISCONTINUED | OUTPATIENT
Start: 2024-04-19 | End: 2024-04-19 | Stop reason: HOSPADM

## 2024-04-19 RX ADMIN — DEXAMETHASONE SODIUM PHOSPHATE 4 MG: 4 INJECTION, SOLUTION INTRAMUSCULAR; INTRAVENOUS at 11:08

## 2024-04-19 RX ADMIN — FENTANYL CITRATE 50 MCG: 50 INJECTION, SOLUTION INTRAMUSCULAR; INTRAVENOUS at 09:00

## 2024-04-19 RX ADMIN — GABAPENTIN 600 MG: 300 CAPSULE ORAL at 07:02

## 2024-04-19 RX ADMIN — ACETAMINOPHEN 975 MG: 325 TABLET ORAL at 07:02

## 2024-04-19 RX ADMIN — SUGAMMADEX 200 MG: 100 INJECTION, SOLUTION INTRAVENOUS at 11:14

## 2024-04-19 RX ADMIN — LIDOCAINE HYDROCHLORIDE 100 MG: 20 INJECTION, SOLUTION INFILTRATION; PERINEURAL at 07:59

## 2024-04-19 RX ADMIN — ONDANSETRON 8 MG: 4 TABLET, ORALLY DISINTEGRATING ORAL at 13:14

## 2024-04-19 RX ADMIN — CEFAZOLIN 2 G: 1 INJECTION, POWDER, FOR SOLUTION INTRAMUSCULAR; INTRAVENOUS at 08:07

## 2024-04-19 RX ADMIN — OXYCODONE HYDROCHLORIDE 5 MG: 5 TABLET ORAL at 12:20

## 2024-04-19 RX ADMIN — Medication 20 MG: at 09:00

## 2024-04-19 RX ADMIN — ONDANSETRON 4 MG: 2 INJECTION INTRAMUSCULAR; INTRAVENOUS at 11:08

## 2024-04-19 RX ADMIN — SODIUM CHLORIDE, POTASSIUM CHLORIDE, SODIUM LACTATE AND CALCIUM CHLORIDE: 600; 310; 30; 20 INJECTION, SOLUTION INTRAVENOUS at 07:42

## 2024-04-19 RX ADMIN — PROPOFOL 200 MG: 10 INJECTION, EMULSION INTRAVENOUS at 07:59

## 2024-04-19 RX ADMIN — METHOCARBAMOL 1000 MG: 100 INJECTION, SOLUTION INTRAMUSCULAR; INTRAVENOUS at 11:08

## 2024-04-19 RX ADMIN — FENTANYL CITRATE 100 MCG: 50 INJECTION, SOLUTION INTRAMUSCULAR; INTRAVENOUS at 07:59

## 2024-04-19 RX ADMIN — Medication 30 MG: at 11:15

## 2024-04-19 RX ADMIN — MIDAZOLAM HYDROCHLORIDE 2 MG: 1 INJECTION, SOLUTION INTRAMUSCULAR; INTRAVENOUS at 07:39

## 2024-04-19 RX ADMIN — ROCURONIUM BROMIDE 30 MG: 10 INJECTION, SOLUTION INTRAVENOUS at 09:21

## 2024-04-19 RX ADMIN — ROCURONIUM BROMIDE 50 MG: 10 INJECTION, SOLUTION INTRAVENOUS at 08:50

## 2024-04-19 RX ADMIN — SODIUM CHLORIDE, SODIUM LACTATE, POTASSIUM CHLORIDE, CALCIUM CHLORIDE: 600; 310; 30; 20 INJECTION, SOLUTION INTRAVENOUS at 08:07

## 2024-04-19 RX ADMIN — ROCURONIUM BROMIDE 50 MG: 10 INJECTION, SOLUTION INTRAVENOUS at 07:59

## 2024-04-19 RX ADMIN — FENTANYL CITRATE 50 MCG: 50 INJECTION, SOLUTION INTRAMUSCULAR; INTRAVENOUS at 08:50

## 2024-04-19 RX ADMIN — ROCURONIUM BROMIDE 20 MG: 10 INJECTION, SOLUTION INTRAVENOUS at 10:23

## 2024-04-19 ASSESSMENT — PAIN - FUNCTIONAL ASSESSMENT
PAIN_FUNCTIONAL_ASSESSMENT: 0-10

## 2024-04-19 ASSESSMENT — PAIN SCALES - GENERAL
PAINLEVEL_OUTOF10: 3
PAINLEVEL_OUTOF10: 0 - NO PAIN
PAINLEVEL_OUTOF10: 4
PAINLEVEL_OUTOF10: 0 - NO PAIN

## 2024-04-19 ASSESSMENT — COLUMBIA-SUICIDE SEVERITY RATING SCALE - C-SSRS
2. HAVE YOU ACTUALLY HAD ANY THOUGHTS OF KILLING YOURSELF?: NO
6. HAVE YOU EVER DONE ANYTHING, STARTED TO DO ANYTHING, OR PREPARED TO DO ANYTHING TO END YOUR LIFE?: NO
1. IN THE PAST MONTH, HAVE YOU WISHED YOU WERE DEAD OR WISHED YOU COULD GO TO SLEEP AND NOT WAKE UP?: NO

## 2024-04-19 NOTE — ANESTHESIA PROCEDURE NOTES
Airway  Date/Time: 4/19/2024 8:02 AM  Urgency: elective      Staffing  Performed: attending   Authorized by: Jairo Connell MD    Performed by: EARL Mota  Patient location during procedure: OR    Indications and Patient Condition  Indications for airway management: anesthesia  Spontaneous Ventilation: absent  Sedation level: deep  Preoxygenated: yes  Patient position: sniffing  MILS maintained throughout  Mask difficulty assessment: 2 - vent by mask + OA or adjuvant +/- NMBA  Planned trial extubation    Final Airway Details  Final airway type: endotracheal airway      Successful airway: ETT     Successful intubation technique: direct laryngoscopy  Blade: Lisha  Blade size: #4  ETT size (mm): 7.0  Cormack-Lehane Classification: grade I - full view of glottis  Placement verified by: capnometry   Measured from: gums  Number of attempts at approach: 1

## 2024-04-19 NOTE — ANESTHESIA PREPROCEDURE EVALUATION
Patient: Sandrita Brady    Procedure Information       Date/Time: 04/19/24 9671    Procedures:       Laparoscopic Lysis Adhesions of Abdominal Cavity (Bilateral: Pelvis)      Hysteroscopy; Uterine Myomectomy (Pelvis)    Location: [facility] OR 05 / Virtual [facility] OR    Surgeons: Josey Benedict MD            Relevant Problems   GYN   (+) Abnormal uterine bleeding (AUB)       Clinical information reviewed:    Allergies  Meds               NPO Detail:  No data recorded     Physical Exam    Airway  Mallampati: II  TM distance: >3 FB  Neck ROM: full     Cardiovascular   Rhythm: regular  Rate: normal     Dental    Pulmonary   Breath sounds clear to auscultation     Abdominal            Anesthesia Plan    History of general anesthesia?: yes  History of complications of general anesthesia?: no    ASA 2     general     intravenous induction   Anesthetic plan and risks discussed with patient.    Plan discussed with CRNA.

## 2024-04-19 NOTE — OP NOTE
Operative Report    Indications: Sandrita Brady is a 33 y.o. female with history of PID who has bilateral hydrosalpinx.    Pre-operative Diagnosis:   Bilateral hydrosalpinx    Post-operative Diagnosis:   Bilateral hydrosalpinx, s/p right salpingectomy  Uterine polyps  Lysis of adhesions > 45 minutes    Procedures:   Hysteroscopy  Laparoscopy  Lysis of adhesions > 45 minutes  Right salpingectomy   Chromopertubation    Surgeon: MD Marichuy  Fellow: Dilma Grant MD   Residents: MD Byron PGY 2    Anesthesia: General    ASA Class: II    Findings:  Hysteroscopy:  Uterine cavity: 2 small polyps noted at each tubal ostia, otherwise smooth uterine cavity  Bilateral ostia: visualized bilaterally    Laparoscopy:  The anterior cul-de-sac: normal in appearance  Posterior cul-de-sac: many filmy adhesions noted  Round ligaments: normal in appearance  The uterus: normal in appearance  The fallopian tubes: bilateral hydrosalpinx, left fallopian tube patent with incision of adhesions surrounding fimbriae, right fallopian tube encased in adhesions- fimbriae not visualized with incision of adhesions  The ovaries: adherent to ovarian fossa's respectively  Liver: teresa-piper bennett present  Appendix: not visualized  Intra-abdominal adhesions present? Yes as described as above    Estimated Blood Loss:  10 cc           Drains: none           Total IV Fluids: 1400mL           Specimens: right fallopian tube, uterine polyps                Complications:  None; patient tolerated the procedure well.           Disposition: PACU - hemodynamically stable.           Condition: stable    Procedure Details   The patient was seen in the Holding Room. The risks, benefits, complications, treatment options, and expected outcomes were discussed with the patient. The possibilities of reaction to medication, pulmonary aspiration, perforation of viscus, bleeding, recurrent infection, the need for additional procedures, failure to diagnose a condition,  and creating a complication requiring transfusion or operation were discussed with the patient. The patient concurred with the proposed plan, giving informed consent. The patient was taken to the Operating Room, identified as Sandrita Brady. A Time Out was held and the above information confirmed.    After induction of general anesthesia, the patient was placed in modified dorsal lithotomy position where she was prepped, draped, and catheterized in the normal, sterile fashion.    A damon was placed in the bladder and a speculum was placed in the vagina. The cervix was visualized and grasped with a tenaculum. The Aveta hysteroscope was advanced into the uterus. The above findings were noted. Hysteroscopic graspers were used to remove the polyps. The hysteroscope was removed. A lucila manipulator was then inserted into the uterus. The tenaculum and speculum were removed from the vagina. Attention was turned to the abdomen.     A small intraumbilical skin incision was made with the scalpel and the 5-mm trocar was used to introduce the laparoscope after injection with 0.25% marcaine. Intra-abdominal placement was confirmed and the abdomen was insufflated with CO2 gas. Following abdominal insufflation, 2 right and 1 left lower quadrant 5-mm trocars were introduced under direct vision. No complications were observed. Following this, the survey of the abdomen was performed with the findings noted. In sum, there was evidence of prior pelvic infection, with multiple filmy pelvic adhesions encasing and kinking the tubes. No normal fimbria were seen initially. Ooqo-Aspy-Jewmdq was present. Chromopertubation was initially unsuccessful and the tubes appeared significantly dilated bilaterally, with the left side affected more than the right.     The scissors were used to take down the pelvic adhesions and release the tubes from their attachments. Further adhesions were noted from the inferior aspects of the tubes to the ovaries.  The tubes were completely encased in scar. The tubes were carefully released from their scarred areas and the distal ends were opened sharply to determine whether we could identify any normal fimbria. The tube was injected with 1 ml of vasopressin (20 units/100 ml) and incised. Once we opened the left tube, it decompressed well and appeared much less dilated with a good amount of normal fimbria on the end of the tube. Sutures were placed circumferentially around the distal fallopian tube for patency and hemostasis. Chromopertubation was performed again and the left fallopian tube was patent.    The same steps were taken on the right fallopian tube. However, fimbriae was not visualized and the decision was made to remove the fallopian tube. Using the Ligasure device, the right fallopian tube was ligated and transected in totality. It was removed and sent to pathology.     Hemostasis was noted. This concluded our procedure. The abdomen was deinsufflated and the skin incisions were closed with 4-0 Vicryl.     Following the procedure the umbilical sheath was removed after intra-abdominal carbon dioxide was expressed. The incision was closed with subcutaneous and subcuticular sutures of 4-0 Vicryl. The intrauterine manipulator and damon were then removed.    Instrument, sponge, and needle counts were correct prior to abdominal closure and at the conclusion of the case.     Dilma Grant MD PGY 5  Reproductive Endocrinology and Infertility Fellow    Attestation: I was present for the entire procedure.   Josey Benedict MD

## 2024-04-19 NOTE — NURSING NOTE
1124: Patient to bay with anesthesia and surgical team present. Handoff report and plan of care reviewed, all questions answered. VSS.    1130: Oral airway removed by AA at this time. Patient removed from supplemental oxygen and placed on room air.    1145: right hand PIV removed at this time, catheter intact upon removal.    1150: Patient placed on purewick at this time due to report of urge to urinate.    1208: Family updated via text message    1215: Patient tolerating sips of ginger ale and crackers at this time    1220: Handoff to [facility]TERO at this time

## 2024-04-19 NOTE — H&P
Pre-operative History and Physical     34 yo G0 with history of PID, bilateral obstructed hydrosalpinx, pelvic pain, and AUB presents for diagnostic laparoscopy, lysis of adhesions, chromopertubation, possible bilateral salpingectomy, possible bilateral salpingostomy, hysteroscopy, D&C      Imaging: (3/7/24) TVUS: uterus 8.6cm anteverted, Right ovarian cyst (3cm) and possible hydrosalpinx.    (10/2023) HSG: no spillage from right fallopian tube, left difficult to determine due to hydrosalpinx    Pre-op Labs: () Cr 0.86, hgb 13.3, plts 296,   (3/7) AMH 12.8, (11/10) TSH 3.0,     Obhx: G0  Gynhx: in 2018 hospitalized with PID, diagnosed with bilateral tubal occlusion, CT. LSIL pap   Medhx: BMI 44, anxiety, migraines, HTN, JEANETTE   Meds: atarax, nifedipine, amlodipine, prilosec, ubrelvy,   Surghx: oral surgery   All: Albuterol (shortness of breath, swelling), Codeine (hives), Naproxen (hives, shortness of breath), and Sulfa drugs (hives)  Soc: works as , non-smoker, no drug use      Obstetrical History   OB History    Para Term  AB Living   0 0 0 0 0 0   SAB IAB Ectopic Multiple Live Births   0 0 0 0 0       Past Medical History  No past medical history on file.     Past Surgical History   No past surgical history on file.    Social History  Social History     Tobacco Use    Smoking status: Never     Passive exposure: Never    Smokeless tobacco: Never   Substance Use Topics    Alcohol use: Yes     Substance and Sexual Activity   Drug Use Never       Allergies  Albuterol, Codeine, Naproxen, and Sulfa (sulfonamide antibiotics)     Medications  No medications prior to admission.       Objective    Last Vitals  Temp Pulse Resp BP MAP O2 Sat                   Physical Examination  Gen: NAD  HEENT: NCAT  Resp: normal work of breathing on room air  Card: regular rate  Neuro: grossly intact   Psych: appropriate  Motor: moving all extremities spontaneously   Abdomen: Non distended      Lab  Review  Labs in chart were reviewed.    Patient d/w Dr Marichuy Matthews MD  OB/GYN PGY3

## 2024-04-22 NOTE — ANESTHESIA POSTPROCEDURE EVALUATION
Patient: Sandrita Brady    Procedure Summary       Date: 04/19/24 [facility]    Anesthesia Start: 0743 Anesthesia Stop: 1130    Procedures:       Laparoscopic Lysis Adhesions of Abdominal Cavity; Right Salpingectomy (Bilateral: Pelvis)      Hysteroscopy (Pelvis)      Exploration Laparoscopy with Chromopertubation (Bilateral: Pelvis) Diagnosis:       Hydrosalpinx      Chronic pelvic pain in female      Abnormal uterine bleeding (AUB)      (Hydrosalpinx [N70.11])      (Chronic pelvic pain in female [R10.2, G89.29])      (Abnormal uterine bleeding (AUB) [N93.9])    Surgeons: Josey Benedict MD Responsible Provider: Jairo Connell MD    Anesthesia Type: general ASA Status: 2            Anesthesia Type: general    Vitals Value Taken Time   /98 04/19/24 1231   Temp 36.4 °C (97.5 °F) 04/19/24 1124   Pulse 69 04/19/24 1243   Resp 16 04/19/24 1230   SpO2 96 % 04/19/24 1243   Vitals shown include unfiled device data.    Anesthesia Post Evaluation    Patient location during evaluation: bedside  Patient participation: complete - patient participated  Level of consciousness: awake  Pain management: adequate  Multimodal analgesia pain management approach  Airway patency: patent  Cardiovascular status: stable  Respiratory status: spontaneous ventilation and unassisted  Hydration status: acceptable  Postoperative Nausea and Vomiting: none  Comments: No significant PONV.        No notable events documented.

## 2024-05-02 ENCOUNTER — TELEMEDICINE (OUTPATIENT)
Dept: ENDOCRINOLOGY | Facility: CLINIC | Age: 34
End: 2024-05-02
Payer: COMMERCIAL

## 2024-05-02 VITALS — BODY MASS INDEX: 44.37 KG/M2 | HEIGHT: 61 IN | WEIGHT: 235 LBS

## 2024-05-02 DIAGNOSIS — Z09 POSTOP CHECK: Primary | ICD-10-CM

## 2024-05-02 PROCEDURE — 99212 OFFICE O/P EST SF 10 MIN: CPT | Performed by: NURSE PRACTITIONER

## 2024-05-02 PROCEDURE — 1036F TOBACCO NON-USER: CPT | Performed by: NURSE PRACTITIONER

## 2024-05-02 ASSESSMENT — PAIN SCALES - GENERAL: PAINLEVEL: 0-NO PAIN

## 2024-05-02 NOTE — PROGRESS NOTES
"  Virtual or Telephone Consent: An interactive audio and video telecommunication system which permits real time communications between the patient (at the originating site) and provider (at the distant site) was utilized to provide this telehealth service    Post Op Visit    Seen Sandrita Brady s/p hysteroscopy, laparoscopy, lysis of adhesions, right salpingectomy and chromotubation on 4/19 with Dr. Benedict. Doing well.   Incisions are healing well.   No issues with bowel or bladder.   No nausea, vomiting, fever or chills  No pain medications.   No vaginal discharge or abnormal bleeding.     Vitals: Ht 1.549 m (5' 1\")   Wt 107 kg (235 lb)   LMP 04/19/2024 (Exact Date)   BMI 44.40 kg/m²   Abdomen: soft, non-tender, incisions healing.-     Prior labs:   CBC  Date: 4/17/2024  Plt: 296 (Ref range: 150 - 450 x10*3/uL)  Hct: 40.5 (Ref range: 36.0 - 46.0 %)    CMP  Date: 4/17/2024   BUN: 11 (Ref range: 6 - 23 mg/dL)  Cre: 0.86 (Ref range: 0.50 - 1.05 mg/dL)  AST: 18 (Ref range: 5 - 40 U/L)  ALT: 21 (Ref range: 5 - 40 U/L)    Assessment:   Normal post op    Plan:  Reviewed surgery and pathology report  Follow up with Dr. Benedict as planned.   No orders of the defined types were placed in this encounter.          Matilde Pittman  05/02/2024  3:35 PM  "

## 2024-05-18 ENCOUNTER — APPOINTMENT (OUTPATIENT)
Dept: ENDOCRINOLOGY | Facility: CLINIC | Age: 34
End: 2024-05-18
Payer: COMMERCIAL

## 2024-06-08 ENCOUNTER — TELEMEDICINE (OUTPATIENT)
Dept: ENDOCRINOLOGY | Facility: CLINIC | Age: 34
End: 2024-06-08
Payer: COMMERCIAL

## 2024-06-08 DIAGNOSIS — Z13.1 SCREENING FOR DIABETES MELLITUS: ICD-10-CM

## 2024-06-08 DIAGNOSIS — Z13.29 SCREENING FOR THYROID DISORDER: Primary | ICD-10-CM

## 2024-06-08 DIAGNOSIS — Z11.3 SCREENING FOR STDS (SEXUALLY TRANSMITTED DISEASES): ICD-10-CM

## 2024-06-08 DIAGNOSIS — Z11.59 ENCOUNTER FOR SCREENING FOR OTHER VIRAL DISEASES: ICD-10-CM

## 2024-06-08 DIAGNOSIS — N92.6 IRREGULAR MENSTRUAL CYCLE: ICD-10-CM

## 2024-06-08 PROCEDURE — 99214 OFFICE O/P EST MOD 30 MIN: CPT | Performed by: OBSTETRICS & GYNECOLOGY

## 2024-06-08 NOTE — PROGRESS NOTES
"  Virtual or Telephone Consent: An interactive audio and video telecommunication system which permits real time communications between the patient (at the originating site) and provider (at the distant site) was utilized to provide this telehealth service and verbal consent was requested and obtained from Sandrita Brady on this date, 06/08/24 for a telehealth visit.    Interval history: Here to review recent surgery. She has had a good recovery. Aware that we had to remove the right tube - did not meet criteria for repair. She has had some irritation on her left side and a suture that came out. She has not had a period since the surgery- they are usually very irregular. AMH = 12.8 ?PCOS (normal T and DHEAS, no A1c done recently)  She initially saw me with her  Devyn- he has not had intake as a patient or SA done.     Past History Reviewed and Affirmed Below: Matilde Pittman  Post Op Visit     Seen Sandrita Brady s/p hysteroscopy, laparoscopy, lysis of adhesions, right salpingectomy and chromotubation on 4/19 with Dr. Benedict. Doing well.   Incisions are healing well.   No issues with bowel or bladder.   No nausea, vomiting, fever or chills  No pain medications.   No vaginal discharge or abnormal bleeding.      Vitals: Ht 1.549 m (5' 1\")   Wt 107 kg (235 lb)   LMP 04/19/2024 (Exact Date)   BMI 44.40 kg/m²   Abdomen: soft, non-tender, incisions healing.-      Prior labs:   CBC  Date: 4/17/2024  Plt: 296 (Ref range: 150 - 450 x10*3/uL)  Hct: 40.5 (Ref range: 36.0 - 46.0 %)     CMP  Date: 4/17/2024   BUN: 11 (Ref range: 6 - 23 mg/dL)  Cre: 0.86 (Ref range: 0.50 - 1.05 mg/dL)  AST: 18 (Ref range: 5 - 40 U/L)  ALT: 21 (Ref range: 5 - 40 U/L)       Orders Placed This Encounter   Procedures    TSH with reflex to Free T4 if abnormal    Hemoglobin A1C    Rubella Antibody, Igg    Varicella Zoster Antibody, Igg    Hepatitis B surface antigen    Hepatitis C Antibody    HIV 1/2 Antigen/Antibody Screen with Reflex to " Confirmation    Syphilis Screen with Reflex    C. Trachomatis / N. Gonorrhoeae, Amplified Detection          FOLLOW UP   Consults: None  Engaged MD  Take prenatal vitamins, vitamin D 2000 IUs daily  Discussed that treatment cannot proceed until checklist items are complete.   Additional testing for BMI < 18 or > 40: No.  Patient to schedule follow up visit in 3 months with me. Advised patient to arrange this now with the .  Chart to primary nurse for care coordination and patient check list/education.    MD Completion:  Ectopic Risk: Yes- prior tubal surgery  Medically Complex: No    Fertility Plan Update: Would like to defer male eval for now and do testing for PCOS/AUB patterns. Would do HSG if not pregnant after 3 ovulatory cycles. Plan is to complete boarding pass and then do letrozole 2.5mg po CD 3-7 days and TI.     Josey Benedict 06/08/24 9:32 AM

## 2024-06-13 DIAGNOSIS — E66.01 CLASS 3 SEVERE OBESITY WITHOUT SERIOUS COMORBIDITY WITH BODY MASS INDEX (BMI) OF 40.0 TO 44.9 IN ADULT, UNSPECIFIED OBESITY TYPE (MULTI): ICD-10-CM

## 2024-07-15 ENCOUNTER — LAB (OUTPATIENT)
Dept: LAB | Facility: LAB | Age: 34
End: 2024-07-15
Payer: COMMERCIAL

## 2024-07-15 ENCOUNTER — TELEPHONE (OUTPATIENT)
Dept: ENDOCRINOLOGY | Facility: CLINIC | Age: 34
End: 2024-07-15

## 2024-07-15 DIAGNOSIS — E66.01 CLASS 3 SEVERE OBESITY WITHOUT SERIOUS COMORBIDITY WITH BODY MASS INDEX (BMI) OF 40.0 TO 44.9 IN ADULT, UNSPECIFIED OBESITY TYPE (MULTI): ICD-10-CM

## 2024-07-15 DIAGNOSIS — Z13.1 SCREENING FOR DIABETES MELLITUS: ICD-10-CM

## 2024-07-15 DIAGNOSIS — Z11.3 SCREENING FOR STDS (SEXUALLY TRANSMITTED DISEASES): ICD-10-CM

## 2024-07-15 DIAGNOSIS — Z11.59 ENCOUNTER FOR SCREENING FOR OTHER VIRAL DISEASES: ICD-10-CM

## 2024-07-15 DIAGNOSIS — Z13.29 SCREENING FOR THYROID DISORDER: ICD-10-CM

## 2024-07-15 DIAGNOSIS — E03.8 SUBCLINICAL HYPOTHYROIDISM: ICD-10-CM

## 2024-07-15 LAB
ALBUMIN SERPL-MCNC: 4.1 G/DL (ref 3.5–5)
ALP BLD-CCNC: 106 U/L (ref 35–125)
ALT SERPL-CCNC: 26 U/L (ref 5–40)
ANION GAP SERPL CALC-SCNC: 11 MMOL/L
AST SERPL-CCNC: 23 U/L (ref 5–40)
BILIRUB SERPL-MCNC: 0.6 MG/DL (ref 0.1–1.2)
BUN SERPL-MCNC: 13 MG/DL (ref 8–25)
CALCIUM SERPL-MCNC: 8.9 MG/DL (ref 8.5–10.4)
CHLORIDE SERPL-SCNC: 102 MMOL/L (ref 97–107)
CO2 SERPL-SCNC: 26 MMOL/L (ref 24–31)
CREAT SERPL-MCNC: 0.8 MG/DL (ref 0.4–1.6)
EGFRCR SERPLBLD CKD-EPI 2021: >90 ML/MIN/1.73M*2
ERYTHROCYTE [DISTWIDTH] IN BLOOD BY AUTOMATED COUNT: 13.1 % (ref 11.5–14.5)
EST. AVERAGE GLUCOSE BLD GHB EST-MCNC: 94 MG/DL
GLUCOSE SERPL-MCNC: 75 MG/DL (ref 65–99)
HBA1C MFR BLD: 4.9 %
HCT VFR BLD AUTO: 43.1 % (ref 36–46)
HGB BLD-MCNC: 14 G/DL (ref 12–16)
MCH RBC QN AUTO: 30.1 PG (ref 26–34)
MCHC RBC AUTO-ENTMCNC: 32.5 G/DL (ref 32–36)
MCV RBC AUTO: 93 FL (ref 80–100)
NRBC BLD-RTO: 0 /100 WBCS (ref 0–0)
PLATELET # BLD AUTO: 307 X10*3/UL (ref 150–450)
POTASSIUM SERPL-SCNC: 4.1 MMOL/L (ref 3.4–5.1)
PROT SERPL-MCNC: 6.8 G/DL (ref 5.9–7.9)
RBC # BLD AUTO: 4.65 X10*6/UL (ref 4–5.2)
SODIUM SERPL-SCNC: 139 MMOL/L (ref 133–145)
TSH SERPL DL<=0.05 MIU/L-ACNC: 3.69 MIU/L (ref 0.27–4.2)
WBC # BLD AUTO: 10.9 X10*3/UL (ref 4.4–11.3)

## 2024-07-15 PROCEDURE — 83036 HEMOGLOBIN GLYCOSYLATED A1C: CPT

## 2024-07-15 PROCEDURE — 86803 HEPATITIS C AB TEST: CPT

## 2024-07-15 PROCEDURE — 86780 TREPONEMA PALLIDUM: CPT

## 2024-07-15 PROCEDURE — 84443 ASSAY THYROID STIM HORMONE: CPT

## 2024-07-15 PROCEDURE — 87491 CHLMYD TRACH DNA AMP PROBE: CPT

## 2024-07-15 PROCEDURE — 80053 COMPREHEN METABOLIC PANEL: CPT

## 2024-07-15 PROCEDURE — 87591 N.GONORRHOEAE DNA AMP PROB: CPT

## 2024-07-15 PROCEDURE — 86376 MICROSOMAL ANTIBODY EACH: CPT

## 2024-07-15 PROCEDURE — 86787 VARICELLA-ZOSTER ANTIBODY: CPT

## 2024-07-15 PROCEDURE — 86317 IMMUNOASSAY INFECTIOUS AGENT: CPT

## 2024-07-15 PROCEDURE — 36415 COLL VENOUS BLD VENIPUNCTURE: CPT

## 2024-07-15 PROCEDURE — 85027 COMPLETE CBC AUTOMATED: CPT

## 2024-07-15 PROCEDURE — 87389 HIV-1 AG W/HIV-1&-2 AB AG IA: CPT

## 2024-07-15 PROCEDURE — 87340 HEPATITIS B SURFACE AG IA: CPT

## 2024-07-15 NOTE — TELEPHONE ENCOUNTER
Returned patient's call, no answer, detailed VM left that lab results need to come back that are still pending. Will need to sign genetic consent if not already done. Dr. Benedict wanted her to schedule a FUV in 3 months and advised patient to call the office to schedule this asap if she is not already scheduled.   Galileo Lema 07/15/24 4:50 PM

## 2024-07-16 DIAGNOSIS — E03.8 SUBCLINICAL HYPOTHYROIDISM: Primary | ICD-10-CM

## 2024-07-16 LAB
C TRACH RRNA SPEC QL NAA+PROBE: NEGATIVE
HBV SURFACE AG SERPL QL IA: NONREACTIVE
HCV AB SER QL: NONREACTIVE
HIV 1+2 AB+HIV1 P24 AG SERPL QL IA: NONREACTIVE
N GONORRHOEA DNA SPEC QL PROBE+SIG AMP: NEGATIVE
RUBV IGG SERPL IA-ACNC: 2.3 IA
RUBV IGG SERPL QL IA: POSITIVE
TREPONEMA PALLIDUM IGG+IGM AB [PRESENCE] IN SERUM OR PLASMA BY IMMUNOASSAY: NONREACTIVE
VARICELLA ZOSTER IGG INDEX: 2.5 IA
VZV IGG SER QL IA: POSITIVE

## 2024-07-16 RX ORDER — LEVOTHYROXINE SODIUM 50 UG/1
50 TABLET ORAL DAILY
Qty: 30 TABLET | Refills: 11 | Status: SHIPPED | OUTPATIENT
Start: 2024-07-16 | End: 2025-07-16

## 2024-07-17 DIAGNOSIS — E06.3 HASHIMOTO'S THYROIDITIS: Primary | ICD-10-CM

## 2024-07-17 LAB — THYROPEROXIDASE AB SERPL-ACNC: 438 IU/ML

## 2024-07-25 ENCOUNTER — TELEPHONE (OUTPATIENT)
Dept: ENDOCRINOLOGY | Facility: CLINIC | Age: 34
End: 2024-07-25
Payer: COMMERCIAL

## 2024-07-25 NOTE — TELEPHONE ENCOUNTER
Telephone call returned to patient. Patient states that starting this morning when she woke up, she had an elevated HR and felt like her chest was heavy/experienced chest pain. Patient states she also felt short of breath accompanied by dizziness and light headedness. Patient states around 11am she had a home blood pressure of 120/101 and around 1pm she had a blood pressure of 123/105. Patient states she also felt nauseas and believes she has a swollen glad/lymph node. Patient states in relation to before, she does feel a bit better now but questions if this was related to the Synthroid. This RN expresses apologies, as the telephone note left was that patient had medication questions. If this RN new that patient was experiencing these symptoms a telephone call would have been returned sooner. Patient also states for awareness that she was previously ordered Nifedipine by her GYN but has not been taking it for some time now. Plan for this RN to make providers aware to inquire on what next steps may be.    07/25/24 at 3:09 PM - Bri Kamara RN    Telephone call returned to patient after speaking with ASIF Mendiola CNP and informing patient she is to go to nearest emergency room due to above symptoms/blood pressures. Patient agreeable and states she will go ASAP.    07/25/24 at 3:14 PM - Bri Kamara RN

## 2024-08-15 ENCOUNTER — APPOINTMENT (OUTPATIENT)
Dept: ENDOCRINOLOGY | Facility: CLINIC | Age: 34
End: 2024-08-15
Payer: COMMERCIAL

## 2024-09-26 ENCOUNTER — TELEMEDICINE (OUTPATIENT)
Dept: ENDOCRINOLOGY | Facility: CLINIC | Age: 34
End: 2024-09-26
Payer: COMMERCIAL

## 2024-09-26 DIAGNOSIS — E28.2 POLYCYSTIC OVARIAN SYNDROME: Primary | ICD-10-CM

## 2024-09-26 PROCEDURE — 99214 OFFICE O/P EST MOD 30 MIN: CPT | Performed by: OBSTETRICS & GYNECOLOGY

## 2024-09-26 RX ORDER — METFORMIN HYDROCHLORIDE 750 MG/1
750 TABLET, EXTENDED RELEASE ORAL
Qty: 30 TABLET | Refills: 11 | Status: SHIPPED | OUTPATIENT
Start: 2024-09-26 | End: 2025-09-26

## 2024-09-26 NOTE — PROGRESS NOTES
Virtual or Telephone Consent: An interactive audio and video telecommunication system which permits real time communications between the patient (at the originating site) and provider (at the distant site) was utilized to provide this telehealth service and verbal consent was requested and obtained from Sandrita Bardy on this date, 24 for a telehealth visit.    Interval history: Sandrita Brady is a 34 y.o. female,   with hx PID and bilateral obstructed hydrosalpinx, pelvic pain, AUB, now s/p unilateral salpingectomy during surgery with me on She went to urgent care due to chest pains in July - she has taken nifedipine which seems to be controlling things better. She has taken synthroid and it made her really sick to her stomach so she did not take it. She has not taken the letrozole 2.5mg x 5 days. She spotted in August throughout the month.   She thinks that they have probably had 3 months of trying to conceive after surgery. Having some vaginal itching (today is a virtual visit).     Findings:  Hysteroscopy:  Uterine cavity: 2 small polyps noted at each tubal ostia, otherwise smooth uterine cavity  Bilateral ostia: visualized bilaterally  Laparoscopy:  The anterior cul-de-sac: normal in appearance  Posterior cul-de-sac: many filmy adhesions noted  Round ligaments: normal in appearance  The uterus: normal in appearance  The fallopian tubes: bilateral hydrosalpinx, left fallopian tube patent with incision of adhesions surrounding fimbriae, right fallopian tube encased in adhesions- fimbriae not visualized with incision of adhesions  The ovaries: adherent to ovarian fossa's respectively  Liver: teresa-piper bennett present  Appendix: not visualized  Intra-abdominal adhesions present? Yes as described as above    Interval history 2024: Here to review recent surgery. She has had a good recovery. Aware that we had to remove the right tube - did not meet criteria for repair. She has had some  "irritation on her left side and a suture that came out. She has not had a period since the surgery- they are usually very irregular. AMH = 12.8 ?PCOS (normal T and DHEAS, no A1c done recently)  She initially saw me with her  Devyn- he has not had intake as a patient or SA done.     Consult from:  Dr Salguero, here today with long term partner Devyn  History of present illness: Sandrita Brady is a 33 y.o. female   who presents with concerns regarding hydrosalpinges. + AUB and pelvic pain, intermenstrual spotting.   Found out in 2018 that her tubes were blocked- she was with an ex at the time and had untreated CT, admitted with PID. She had IV abx and pain meds. Hospitalized for 4 days.   Prior to that she had irregular periods and was not sure whether endometriosis or PCOS. Her mother had endometriosis and had a hysterectomy. She couldn't take OCPs because they made her nauseated and contributed to weight gain.   She is an  and has some skin irritation - gained 70 lbs, wasn't able to keep weight gain.   She did have a TSH 4 years ago = 4.95. TSH 3 months ago was 3.   She does have some pelvic pain occasionally.         Past History Reviewed and Affirmed Below:   Post Op Visit     Seen Sandrita D Jose Antonio s/p hysteroscopy, laparoscopy, lysis of adhesions, right salpingectomy and chromotubation on  with Dr. Benedict. Doing well.   Incisions are healing well.   No issues with bowel or bladder.   No nausea, vomiting, fever or chills  No pain medications.   No vaginal discharge or abnormal bleeding.      Vitals: Ht 1.549 m (5' 1\")   Wt 107 kg (235 lb)   LMP 2024 (Exact Date)   BMI 44.40 kg/m²   Abdomen: soft, non-tender, incisions healing.-      Prior labs:   CBC  Date: 2024  Plt: 296 (Ref range: 150 - 450 x10*3/uL)  Hct: 40.5 (Ref range: 36.0 - 46.0 %)     CMP  Date: 2024   BUN: 11 (Ref range: 6 - 23 mg/dL)  Cre: 0.86 (Ref range: 0.50 - 1.05 mg/dL)  AST: 18 (Ref range: 5 - " 40 U/L)  ALT: 21 (Ref range: 5 - 40 U/L)       No orders of the defined types were placed in this encounter.         FOLLOW UP   Consults: None  Engaged MD  Take prenatal vitamins, vitamin D 2000 IUs daily  Discussed that treatment cannot proceed until checklist items are complete.   Additional testing for BMI < 18 or > 40: No.  Patient to schedule follow up visit in 3 months with me. Advised patient to arrange this now with the .  Chart to primary nurse for care coordination and patient check list/education.    MD Completion:  Ectopic Risk: Yes- prior tubal surgery  Medically Complex: No    Fertility Plan Update: Would like to defer male eval for now and do testing for PCOS/AUB patterns. Would do HSG if not pregnant after 3 ovulatory cycles. Plan is to complete boarding pass and then do letrozole 2.5mg po CD 3-7 days and TI.     Josey Benedict 24 9:32 AM        Sandrita Brady is a 34 y.o. female,   with hx PID and bilateral obstructed hydrosalpinx, pelvic pain, AUB, now s/p unilateral salpingectomy on right tube and left neosalpingostomy in April. Has tried to conceive unassisted for 2-3 months. Ready for new plan. ? ovulatory     PLAN:  Fertility Plan Update: Would like to defer male eval for now based on patient preferences. Will do HSG if not pregnant after 3 ovulatory cycles with letrozole. Chart to Rns to ensure complete boarding pass and then rx letrozole 2.5mg po CD 3-7 days and TI.   Will see Dr Salguero for vaginal itching  Metformin sent in 750mg XR daily.   FUV in 3-4 months  Josey eBnedict 24 11:40 AM

## 2024-09-26 NOTE — PROGRESS NOTES
Message left for patient regarding myriad genetic testing. Patient signed consent for genetic testing to be drawn back in June but patient has not had drawn to date. Patient instructed to call office or send [employer] message to this nurse to let her know if she desires to have this testing done. Once we know what patient would like to do we can complete her boarding pass and review with

## 2024-10-11 ENCOUNTER — TELEPHONE (OUTPATIENT)
Dept: ENDOCRINOLOGY | Facility: CLINIC | Age: 34
End: 2024-10-11

## 2024-10-11 NOTE — PROGRESS NOTES
Returned patient call regarding her concerns with her checklist. Patient and her partner have decided to decline myriad testing. I sent darrick via engaged MD for patient and spouse to sign. Patient transferred to  to schedule virtual boarding pass appointment for early next week as patient expects menses any day now.  vesta gay 10/11/24 2:16 PM

## 2024-10-14 ENCOUNTER — TELEMEDICINE (OUTPATIENT)
Dept: ENDOCRINOLOGY | Facility: CLINIC | Age: 34
End: 2024-10-14
Payer: COMMERCIAL

## 2024-10-14 DIAGNOSIS — E66.01 SEVERE OBESITY DUE TO EXCESS CALORIES WITH SERIOUS COMORBIDITY AND BODY MASS INDEX (BMI) 120% OF 95TH PERCENTILE TO LESS THAN 140% OF 95TH PERCENTILE FOR AGE IN PEDIATRIC PATIENT: Primary | ICD-10-CM

## 2024-10-14 DIAGNOSIS — I10 HYPERTENSION, UNSPECIFIED TYPE: ICD-10-CM

## 2024-10-14 DIAGNOSIS — Z68.55 SEVERE OBESITY DUE TO EXCESS CALORIES WITH SERIOUS COMORBIDITY AND BODY MASS INDEX (BMI) 120% OF 95TH PERCENTILE TO LESS THAN 140% OF 95TH PERCENTILE FOR AGE IN PEDIATRIC PATIENT: Primary | ICD-10-CM

## 2024-10-14 DIAGNOSIS — Z31.41 FERTILITY TESTING: ICD-10-CM

## 2024-10-14 PROCEDURE — 1036F TOBACCO NON-USER: CPT | Performed by: NURSE PRACTITIONER

## 2024-10-14 PROCEDURE — 99213 OFFICE O/P EST LOW 20 MIN: CPT | Performed by: NURSE PRACTITIONER

## 2024-10-14 NOTE — Clinical Note
Boarding pass to amend for step up to letrozole 5mg on 1/13/25. This will be her second cycle with this dose

## 2024-10-14 NOTE — PROGRESS NOTES
Boarding Pass Oral TIC/IUI     Age: 34 y.o.    Provider: Josey Benedict MD  Primary RN: Mari Pavon  Reasons for Treatment:hx PID and bilateral obstructed hydrosalpinx, pelvic pain, AUB, now s/p unilateral salpingectomy   Last BMI  24 : 44.40 kg/m²         Medical History        Past Medical History:   Diagnosis Date    Hypertension              Date Done Consultation Results/Comments   2024 Medication Protocol    Fertility Plan Update:: Letrozole 2.5mg cycle date 3-7/ TIC  Trigger plan:  NA  Adjuncts:  NA  Notes: Will do HSG if not pregnant after 3 ovulatory cycles with Letrozole.  Patient on Metformin 750 mg XR daily.   NA Procedure Order Placed []  NA   Date Done Female Labs Results/Comments   2024 T&S (Q 1 Year) ABO: A  Rh: POS  Antibody: NEG      7/15/2024 Hep B sAg Nonreactive   7/15/2024 Hep C AB Nonreactive   7/15/2024 HIV Nonreactive   7/15/2024 Syphilis Nonreactive   7/15/2024 GC/CT GC: Negative  CT: Negative   7/15/2024 Rubella (Q 5 Years) Positive   7/15/2024 Varicella (Q 5 Years) Positive (A)   10/11/2024 Carrier Screening Myriad 2bP: NA  Declined  NA   Date Done Male Labs (required if IUI)    Results/Comments  AGATHA BARTLETT (MRN: [id number])   NA Hep B sAg NA   NA Hep C AB  NA   NA HIV NA   NA Syphilis NA   NA GC/CT GC: NA  CT: NA   10/11/2024 Carrier Screening NA  Declined  N/A   NA Semen Analysis  Volume(mL): NA  Count(million): NA  Motility(%): NA  Motile Count(million): NA   Date Done Miscellaneous Results/Comments   2024 BMI Checklist  BMI > 40 or < 18 Added to chart:   Yes; Boarding Pass BMI Checklist (BMI > 40 or < 18)     Date Done Testing Results   7/15/2024 CBC Plt: 307 (Ref range: 150 - 450 x10*3/uL)  Hct: 43.1 (Ref range: 36.0 - 46.0 %)   7/15/2024 CMP BUN: 13 (Ref range: 8 - 25 mg/dL)  Cre: 0.80 (Ref range: 0.40 - 1.60 mg/dL)  AST: 23 (Ref range: 5 - 40 U/L)  ALT: 26 (Ref range: 5 - 40 U/L)   7/15/2024 HgbA1C 4.9 (Ref range: See below %)    11/13/24 MFM clearance required:    BMI > 40 with co-morbidities    BMI > 45- all patients Clearance Letter-Provider Reviewed: yes-   9/26/2024 Additional Labs (BMI < 18) Magnesium: NA  Phosphate: NA  Vitamin D: NA   9/26/2024 Weight Loss / Nutrition Consult (must be offered for BMI > 40) Declined - provider documentation      09/26/2024 >= 45 Checklist  Added to chart:   No      MD Completion:  Ectopic Risk: Yes Prior tubal surgery  Medically Complex: No    LMP: 10/11/24- only bled for 1 days. Very light since surgery.  Almost every month having cycles.     Letrozole: 1 tablet(s) a day, cycle days 3-7   Note: 1st day of full flow is cycle day 1      Have sexual intercourse approximately every other day for 1 week beginning cycle day 11  You don't need temperature charts or LH predictor kits because normal cycle lengths indicate ovulatory cycles.  If you are planning inseminations, you will use LH predictor kits for timing    If normal 28 - 32 day cycle, repeat above for a total of 3 cycles    Call office if:   pregnant  cycles longer than 35 days   not pregnant after 3 regular cycles     Side effects of Letrozole may include:  acne  headache   hot flushes  leg cramps   nausea     If side effects are severe, alternative medications may be available. Letrozole has a 8-10% chance of twins and less than 1 % chance of triplets.     Progesterone:  May be given to bring on a period if you have not had a period after 40 days and a home pregnancy test is negative. Continue to take Progesterone if you have light bleeding. Expect to bleed within 2 weeks of finishing Progesterone    Letrozole is used for ovulation inductions. Letrozole is not a FDA approved medication for infertility treatment and may be associated with an increased for of congenital defects, although this was not found in a recent large study of patient taking letrozole for unexplained infertility. Letrozole is teratogenic therefore a urine pregnancy test  should be taken prior to taking the medication.      Boarding pass reviewed with:  Protocol: Letrozole 2.5 mg with TI x 3 cycles. Plan on cd 21 progesterone the first cycle  Sperm: partner  Testing up to date. NA  Procedure order placed.NA    Unable to approve boarding pass. Needs MFM visit. Did confirm with Dr. Benedict. Will mychart patient (was discussed at visit today). Can schedule this and then will clear for treatment. Does not need another appointment. Plan to call with next menses for meds and to move forward with above plan if cleared by MFM.  Matilde Pittman 10/14/24 2:20 PM    Reviewed MFM note. Cleared for treatment. Ok to proceed as planned above.    Matilde Pittman 11/13/24 3:47 PM        Patient stepped up to letrozole 5mg on 1/13/25 for non-ovulatory P4 level  Heike Shah 02/17/25 4:09 PM    MD note: addendum.  Plan for letrozole 5mg with cd 21 progesterone to confirm ovulation. Plan on 3 ovulatory cycles.  Matilde Pittman 02/17/25 4:13 PM

## 2024-11-12 NOTE — PROGRESS NOTES
Sandrita Brady is a 34 y.o. G0 presenting for an MFM [facility] due to cHTN and class III obesity. Today she complains of prolonged very light menses, up to 3 weeks. ROS is negative.     Her primary OB is Dr. Salguero.      Issues:   Chronic HTN- prescribed nifedipine but has not been taking.   Class III obesity- BMI 44.5 today.     OBHx: nullipara  GynHx: hx of PID, hx of chlamydia, hydrosalpinx  MedHx: obesity, cHTN, anxeity, migraines, JEANETTE  SurgHx: oral surgery, lapsky for KYE and salpingectomy  Meds: hydroxyzine PRN, metformin, nifedipine (not currently taking)  All: albuterol, codeine, sulfa drugs, naproxen  FamHx: nephew with autism   SocHx: no toxic habits    O: Visit Vitals  /88   Wt 107 kg (236 lb)   BMI 44.59 kg/m²   OB Status Having periods   Smoking Status Never   BSA 2.15 m²      Physical exam deferred for this consultative visit    A/P: 35yo G0 presenting for a preconception consultation. We discussed the following:   Chronic HTN  She has been counseled on the implications of cHTN during pregnancy including the increased risk of  delivery, placental abruption, pre-eclampsia/HELLP, FGR,  NICU admission and stillbirth.  We discussed that antihypertensive medications cross the placenta but are generally considered safe with first line medications being labetalol and nifedipine due to their efficacy and safety data.  Generally, blood pressure targets are <140 systolic and <90 diastolic.   We discussed that due to the physiologic decrease in systemic vascular resistance during pregnancy, blood pressure often is lower in the first and second trimesters before returning to their baseline values in the third trimester and that blood pressure medications often need to be titrated as gestation advanced.  However careful attention should be paid to the potential development of pre-eclampsia if increased doses are required.  We discussed  that we recommend obtaining baseline pre-eclampsia labs as well as serial growth ultrasounds to screen for FGR.  If oral antihypertensive is required we recommend  testing with twice weekly NSTs to start at 32 weeks.  We recommend delivery at 38-39 weeks for patients with cHTN that is well controlled without medication, 37-38 in patients well controlled on medication and delivery at 37 weeks in patients that are poorly controlled.    2. Class III obesity  Today we discussed the pregnancy implications including increased risk of pre-eclampsia, gestational diabetes,  delivery, LGA/macrosomia, growth restriction, stillbirth, shoulder dystocia, venous thromboembolic disease and congenital anomalies.  We reviewed that modest weight loss of even 5% of total body weight is associated with a decrease in these risks.  We also discussed that preconception BMI appears to more predictive of adverse pregnancy outcome than gestational weight gain and that it is unclear what is optimal weight gain in pregnancy in women with obesity, though weight loss is discouraged during pregnancy.  We reviewed the [place] [employer] recommendations for gestational weight gain obese women, though subsequent large studies have suggestive that lower weight gain may be associated with lower rates of maternal and fetal morbidity and that we recommend that she limit her weight gain to no more than 11-20lbs during pregnancy and that weight gain less than 11-20 lbs is also acceptable assuming normal fetal growth.  To address some of these concerns we discussed increased pregnancy surveillance to include an early screen for diabetes, detailed anatomic evaluation of the fetus and serial growth ultrasound to assess fetal weight.  We discussed the role of a healthy diet with consideration for referral to a nutritionist as well as exercise.      In summary, I see no overt contraindications to assisted reproduction. Thank you for allowing  me to participate in the care of your patient. Please do not hesitate to contact me with any further questions.     Cherrie Cassidy MD  Maternal Fetal Medicine  Director of Fetal Intervention

## 2024-11-13 ENCOUNTER — INITIAL PRENATAL (OUTPATIENT)
Dept: MATERNAL FETAL MEDICINE | Facility: CLINIC | Age: 34
End: 2024-11-13
Payer: COMMERCIAL

## 2024-11-13 VITALS — DIASTOLIC BLOOD PRESSURE: 88 MMHG | BODY MASS INDEX: 44.59 KG/M2 | SYSTOLIC BLOOD PRESSURE: 147 MMHG | WEIGHT: 236 LBS

## 2024-11-13 DIAGNOSIS — Z31.69 ENCOUNTER FOR PRECONCEPTION CONSULTATION: Primary | ICD-10-CM

## 2024-11-13 DIAGNOSIS — E66.813 OBESITY, CLASS 3: ICD-10-CM

## 2024-11-13 DIAGNOSIS — I10 CHRONIC HYPERTENSION: ICD-10-CM

## 2024-11-13 PROCEDURE — 99214 OFFICE O/P EST MOD 30 MIN: CPT | Performed by: OBSTETRICS & GYNECOLOGY

## 2024-11-13 PROCEDURE — 99244 OFF/OP CNSLTJ NEW/EST MOD 40: CPT | Performed by: OBSTETRICS & GYNECOLOGY

## 2024-11-13 ASSESSMENT — ENCOUNTER SYMPTOMS
CONSTITUTIONAL NEGATIVE: 0
HEMATOLOGIC/LYMPHATIC NEGATIVE: 0
EYES NEGATIVE: 0
NEUROLOGICAL NEGATIVE: 0
CARDIOVASCULAR NEGATIVE: 0
ALLERGIC/IMMUNOLOGIC NEGATIVE: 0
ENDOCRINE NEGATIVE: 0
GASTROINTESTINAL NEGATIVE: 0
MUSCULOSKELETAL NEGATIVE: 0
RESPIRATORY NEGATIVE: 0
PSYCHIATRIC NEGATIVE: 0

## 2024-11-20 ENCOUNTER — TELEPHONE (OUTPATIENT)
Dept: ENDOCRINOLOGY | Facility: CLINIC | Age: 34
End: 2024-11-20
Payer: COMMERCIAL

## 2024-11-20 DIAGNOSIS — N97.9 FEMALE INFERTILITY: ICD-10-CM

## 2024-11-20 RX ORDER — LETROZOLE 2.5 MG/1
2.5 TABLET, FILM COATED ORAL DAILY
Qty: 5 TABLET | Refills: 0 | Status: SHIPPED | OUTPATIENT
Start: 2024-11-20 | End: 2025-02-18

## 2024-11-20 NOTE — TELEPHONE ENCOUNTER
Reason for call: reporting start of cycle  LMP: 11/20  Treatment type: timed intercourse  Note: wants a call back for next steps

## 2024-11-20 NOTE — TELEPHONE ENCOUNTER
Called the patient she verified her name and date of birth needs letrozole script I advised to take home pregnancy test before starting letrozole I advised to start letrozole Friday I advised to get labs day 21 period started today 11-20-24  Patient verbalized an understanding    ANIL GARCIA 11/20/24 2:46 PM

## 2024-12-23 DIAGNOSIS — N97.9 FEMALE INFERTILITY: ICD-10-CM

## 2024-12-23 RX ORDER — LETROZOLE 2.5 MG/1
2.5 TABLET, FILM COATED ORAL DAILY
Qty: 5 TABLET | Refills: 0 | Status: SHIPPED | OUTPATIENT
Start: 2024-12-23 | End: 2025-03-23

## 2025-01-02 ENCOUNTER — TELEMEDICINE (OUTPATIENT)
Dept: ENDOCRINOLOGY | Facility: CLINIC | Age: 35
End: 2025-01-02
Payer: COMMERCIAL

## 2025-01-02 DIAGNOSIS — Z01.818 PRE-PROCEDURAL EXAMINATION: Primary | ICD-10-CM

## 2025-01-02 DIAGNOSIS — N70.93: ICD-10-CM

## 2025-01-02 PROCEDURE — 1036F TOBACCO NON-USER: CPT | Performed by: OBSTETRICS & GYNECOLOGY

## 2025-01-02 PROCEDURE — 99213 OFFICE O/P EST LOW 20 MIN: CPT | Performed by: OBSTETRICS & GYNECOLOGY

## 2025-01-02 NOTE — PROGRESS NOTES
Virtual or Telephone Consent: An interactive audio and video telecommunication system which permits real time communications between the patient (at the originating site) and provider (at the distant site) was utilized to provide this telehealth service    Interval history: Had two weeks of bleeding immediately after starting her first course of letrozole. This is her second month and she was supposed to get bloodwork but was unable. She is waiting for her next period to start in a few weeks for her third round.  She felt sick on the metformin and she could not take it (tried for one month). She is taking her nifedipine.      Past History Reviewed and Affirmed Below:  Virtual or Telephone Consent: An interactive audio and video telecommunication system which permits real time communications between the patient (at the originating site) and provider (at the distant site) was utilized to provide this telehealth service and verbal consent was requested and obtained from Sandrita Brady on this date, 25 for a telehealth visit.    Interval history: Sandrita Brady is a 34 y.o. female,   with hx PID and bilateral obstructed hydrosalpinx, pelvic pain, AUB, now s/p unilateral salpingectomy during surgery with me on She went to urgent care due to chest pains in July - she has taken nifedipine which seems to be controlling things better. She has taken synthroid and it made her really sick to her stomach so she did not take it. She has not taken the letrozole 2.5mg x 5 days. She spotted in August throughout the month.   She thinks that they have probably had 3 months of trying to conceive after surgery. Having some vaginal itching (today is a virtual visit).     Findings:  Hysteroscopy:  Uterine cavity: 2 small polyps noted at each tubal ostia, otherwise smooth uterine cavity  Bilateral ostia: visualized bilaterally  Laparoscopy:  The anterior cul-de-sac: normal in appearance  Posterior cul-de-sac: many  filmy adhesions noted  Round ligaments: normal in appearance  The uterus: normal in appearance  The fallopian tubes: bilateral hydrosalpinx, left fallopian tube patent with incision of adhesions surrounding fimbriae, right fallopian tube encased in adhesions- fimbriae not visualized with incision of adhesions  The ovaries: adherent to ovarian fossa's respectively  Liver: teresa-piper bennett present  Appendix: not visualized  Intra-abdominal adhesions present? Yes as described as above    Interval history 2024: Here to review recent surgery. She has had a good recovery. Aware that we had to remove the right tube - did not meet criteria for repair. She has had some irritation on her left side and a suture that came out. She has not had a period since the surgery- they are usually very irregular. AMH = 12.8 ?PCOS (normal T and DHEAS, no A1c done recently)  She initially saw me with her  Devyn- he has not had intake as a patient or SA done.     Consult from:  Dr Salguero, here today with long term partner Devyn  History of present illness: Sandrita Brady is a 33 y.o. female   who presents with concerns regarding hydrosalpinges. + AUB and pelvic pain, intermenstrual spotting.   Found out in 2018 that her tubes were blocked- she was with an ex at the time and had untreated CT, admitted with PID. She had IV abx and pain meds. Hospitalized for 4 days.   Prior to that she had irregular periods and was not sure whether endometriosis or PCOS. Her mother had endometriosis and had a hysterectomy. She couldn't take OCPs because they made her nauseated and contributed to weight gain.   She is an  and has some skin irritation - gained 70 lbs, wasn't able to keep weight gain.   She did have a TSH 4 years ago = 4.95. TSH 3 months ago was 3.   She does have some pelvic pain occasionally.         Past History Reviewed and Affirmed Below:   Post Op Visit     Seen Sandrita Brady s/p hysteroscopy,  "laparoscopy, lysis of adhesions, right salpingectomy and chromotubation on  with Dr. Benedict. Doing well.   Incisions are healing well.   No issues with bowel or bladder.   No nausea, vomiting, fever or chills  No pain medications.   No vaginal discharge or abnormal bleeding.      Vitals: Ht 1.549 m (5' 1\")   Wt 107 kg (235 lb)   LMP 2024 (Exact Date)   BMI 44.40 kg/m²   Abdomen: soft, non-tender, incisions healing.-      Prior labs:   CBC  Date: 2024  Plt: 296 (Ref range: 150 - 450 x10*3/uL)  Hct: 40.5 (Ref range: 36.0 - 46.0 %)     CMP  Date: 2024   BUN: 11 (Ref range: 6 - 23 mg/dL)  Cre: 0.86 (Ref range: 0.50 - 1.05 mg/dL)  AST: 18 (Ref range: 5 - 40 U/L)  ALT: 21 (Ref range: 5 - 40 U/L)       No orders of the defined types were placed in this encounter.         FOLLOW UP   Consults: None  Engaged MD  Take prenatal vitamins, vitamin D 2000 IUs daily  Discussed that treatment cannot proceed until checklist items are complete.   Additional testing for BMI < 18 or > 40: No.  Patient to schedule follow up visit in 3 months with me. Advised patient to arrange this now with the .  Chart to primary nurse for care coordination and patient check list/education.    MD Completion:  Ectopic Risk: Yes- prior tubal surgery  Medically Complex: No    Fertility Plan Update: Would like to defer male eval for now and do testing for PCOS/AUB patterns. Would do HSG if not pregnant after 3 ovulatory cycles. Plan is to complete boarding pass and then do letrozole 2.5mg po CD 3-7 days and TI.     Josey Benedict 24 9:32 AM        Sandrita Brady is a 34 y.o. female,   with hx PID and bilateral obstructed hydrosalpinx, pelvic pain, AUB, now s/p unilateral salpingectomy on right tube and left neosalpingostomy in April. Has tried to conceive unassisted for 2-3 months. Ready for new plan. ? ovulatory     PLAN:  Fertility Plan Update: Would like to defer male eval for now based on patient " preferences. Will do HSG if not pregnant after 3 ovulatory cycles with letrozole. Chart to Rns to ensure complete boarding pass and then rx letrozole 2.5mg po CD 3-7 days and TI.   Will see Dr Salguero for vaginal itching  Metformin sent in 750mg XR daily.   FUV in 3-4 months  Josey Benedict 09/26/24 11:40 AM      FOLLOW UP   Consults: NONE  Engaged MD  Take prenatal vitamins, vitamin D 2000 IUs daily  Discussed that treatment cannot proceed until checklist items are complete.   Additional testing for BMI < 18 or > 40: WOULD NEED BMI UPDATED AND CHECKLIST IF NOT COMPLETE ALREADY- REVIEWED IVF BMI CUTOFF.  Patient to schedule follow up visit in 2-3 MONTHS. Advised patient to arrange this now with the .  Chart to primary nurse for care coordination and patient check list/education.    MD Completion:  Ectopic Risk: Yes  Medically Complex: No    Fertility Plan Update:  TSH and progesterone test as planned on 1/10/24  If doing letrozole again, will do letrozole 5mg CD 3-7   If not pregnant, will plan HSG (ordered with dx salpingitis given hx)  Ok to hold metformin and synthroid due two side effects  Talk with partner Devyn about completing SA. We talked about adding IUI versus monitored cycle next. She has also asked him to look into [employer] fertility coverage.   FUV in 2-3 months    Josey Benedict 01/02/25 12:20 PM

## 2025-01-10 ENCOUNTER — LAB (OUTPATIENT)
Dept: LAB | Facility: LAB | Age: 35
End: 2025-01-10
Payer: COMMERCIAL

## 2025-01-10 DIAGNOSIS — N97.9 FEMALE INFERTILITY: ICD-10-CM

## 2025-01-10 DIAGNOSIS — E06.3 HASHIMOTO'S THYROIDITIS: ICD-10-CM

## 2025-01-10 LAB
PROGEST SERPL-MCNC: 0.3 NG/ML
TSH SERPL-ACNC: 3 MIU/L (ref 0.44–3.98)

## 2025-01-10 PROCEDURE — 84144 ASSAY OF PROGESTERONE: CPT

## 2025-01-10 PROCEDURE — 84443 ASSAY THYROID STIM HORMONE: CPT

## 2025-01-10 NOTE — PROGRESS NOTES
Patient presents today for TSH and progesterone test .  vesta gay 01/10/25 11:41 AM      Contacted patient with reminder call for blood work. As of now patient has not gone to lab. Left detailed voice message. Patient added to noon huddle on Monday 01/13/2025.  vesta gay 01/10/25 2:15 PM

## 2025-01-13 DIAGNOSIS — N97.9 FEMALE INFERTILITY: ICD-10-CM

## 2025-01-13 RX ORDER — LETROZOLE 2.5 MG/1
5 TABLET, FILM COATED ORAL DAILY
Qty: 10 TABLET | Refills: 0 | Status: SHIPPED | OUTPATIENT
Start: 2025-01-13 | End: 2025-04-13

## 2025-01-13 NOTE — PROGRESS NOTES
Patient presents today for TSH and progesterone test.    Latest Reference Range & Units 01/10/25 14:39   Progesterone ng/mL 0.3   Thyroid Stimulating Hormone 0.44 - 3.98 mIU/L 3.00   vesta gay 01/13/25 11:14 AM    HUDDLE PROVIDER NOTE- PROGESTERONE CHECK  Ultrasound and/or labs reviewed at [facility].   Results for orders placed or performed in visit on 01/10/25 (from the past 96 hours)   TSH with reflex to Free T4 if abnormal   Result Value Ref Range    Thyroid Stimulating Hormone 3.00 0.44 - 3.98 mIU/L   Progesterone   Result Value Ref Range    Progesterone 0.3 ng/mL       Ovulatory progesterone  no  Need to confirm if she took medications this cycle.    TSH normal- no meds needed.   Quick start letrozole 5mg x 5 days. Check a progesterone 2 weeks after last set of pills.  Matilde Pittman  01/13/2025  11:46 AM      Contacted patient with plan. Patient verbalized understanding. Message sent to  to add patient to noon huddle on 1/31/2025.  vesta gay 01/13/25 3:24 PM

## 2025-01-24 PROCEDURE — 88175 CYTOPATH C/V AUTO FLUID REDO: CPT

## 2025-01-24 PROCEDURE — 87624 HPV HI-RISK TYP POOLED RSLT: CPT

## 2025-01-24 PROCEDURE — 88141 CYTOPATH C/V INTERPRET: CPT | Performed by: STUDENT IN AN ORGANIZED HEALTH CARE EDUCATION/TRAINING PROGRAM

## 2025-01-27 ENCOUNTER — LAB REQUISITION (OUTPATIENT)
Dept: LAB | Facility: HOSPITAL | Age: 35
End: 2025-01-27
Payer: COMMERCIAL

## 2025-01-27 DIAGNOSIS — Z11.51 ENCOUNTER FOR SCREENING FOR HUMAN PAPILLOMAVIRUS (HPV): ICD-10-CM

## 2025-01-27 DIAGNOSIS — R87.810 CERVICAL HIGH RISK HUMAN PAPILLOMAVIRUS (HPV) DNA TEST POSITIVE: ICD-10-CM

## 2025-01-27 DIAGNOSIS — R87.612 LOW GRADE SQUAMOUS INTRAEPITHELIAL LESION ON CYTOLOGIC SMEAR OF CERVIX (LGSIL): ICD-10-CM

## 2025-01-31 NOTE — PROGRESS NOTES
Patient presents today for P4 following step-up to letrozole 5mg on 01/13/2025.   vesta gay 01/31/25 11:02 AM    As of now labs have not resulted. Patient added to noon [facility] for Monday 02/03/2025.  vesta gay 01/31/25 3:31 PM

## 2025-02-03 DIAGNOSIS — N97.9 FEMALE INFERTILITY: ICD-10-CM

## 2025-02-03 NOTE — PROGRESS NOTES
Patient went to lab on 1/31/2025 for P4 after step up to letrozole 5mg on 1/13/2025.  Will call patient with results and plan.    Eleanor NICOLLE Severino 02/03/25 8:30 AM      Patient states she went to [facility] lab on 1/31/2025 for progesterone level.   [facility]ed [facility] to see where patient specimen is, specimen was sent out to [place] for processing instead of [facility] lab and can take 4-8 days to result.           Plan to repeat E2 and P4 in next 1-2 days to help plan cycle.    Sadi Tran 02/03/25 4:10 PM    Sent [employer] message with plan.

## 2025-02-04 ENCOUNTER — LAB REQUISITION (OUTPATIENT)
Dept: LAB | Facility: HOSPITAL | Age: 35
End: 2025-02-04

## 2025-02-04 DIAGNOSIS — N97.9 FEMALE INFERTILITY, UNSPECIFIED: ICD-10-CM

## 2025-02-04 PROCEDURE — 84144 ASSAY OF PROGESTERONE: CPT

## 2025-02-04 PROCEDURE — 84144 ASSAY OF PROGESTERONE: CPT | Mod: OUT,AHULAB | Performed by: NURSE PRACTITIONER

## 2025-02-04 NOTE — PROGRESS NOTES
Patient went to lab on 1/31/2025 for P4 after step up to letrozole 5mg on 1/13/2025 for progesterone level.   Per [facility] lab patient's specimen was sent out to [place] for processing instead of [facility] lab and can take 4-8 days to result.   Plan to repeat P4 today to be resulted in house at .   Will call patient with results and plan.     Eleanor Severino 02/04/25 8:04 AM    Labs not back prior to end of day, patient added to noon huddle for tomorrow 2/5.     Eleanor Severino 02/04/25 4:41 PM

## 2025-02-05 LAB — PROGEST SERPL-MCNC: 3.9 NG/ML

## 2025-02-05 NOTE — PROGRESS NOTES
Patient went to lab on 1/31/2025 after step up to letrozole 5mg on 1/13/2025 for progesterone level. Lab repeated on 2/4 due to prolonged result of previous draw. Progesterone level came back at 3.9. Will review with provider and contact patient to discuss.   Lashay Thompson 02/05/25 8:18 AM       [facility]  Ultrasound and/or labs reviewed at [facility].   Results for orders placed or performed in visit on 02/04/25 (from the past 96 hours)   Progesterone   Result Value Ref Range    Progesterone 3.9 ng/mL       Ovulatory progesterone  yes (cd 25 labs were done)    Next steps: Call with menses or if no menses within two weeks, check pregnancy test and call with result. Track total cycle length.   Matilde Pittman  02/05/2025  11:43 AM    Called patient to review lab work as discussed with SOHAN Clayton. Informed patient that lab work did confirm ovulation and to contact office in the next couple of weeks with either next period or positive pregnancy test. Patient verbalized understanding. Encouraged patient to call office with any questions or concerns.  Lashay Thompson 02/05/25 2:33 PM

## 2025-02-10 LAB — PROGEST SERPL-MCNC: 0.8 NG/ML

## 2025-02-17 ENCOUNTER — TELEPHONE (OUTPATIENT)
Dept: ENDOCRINOLOGY | Facility: CLINIC | Age: 35
End: 2025-02-17
Payer: COMMERCIAL

## 2025-02-17 NOTE — TELEPHONE ENCOUNTER
Patient called with menses for TIC cycle  LMP: 2/17/25  Cycle #:  2 (2nd with increased dose - has done 2 cycles of letrozole 2.5mg previously)  Medication: Letrozole 5mg  Ovulation: LH Kit - will check CD 21 p4 level   Sperm Source:  partner fresh    Luteal Support:  n/a  Additional Medications:  n/a  Boarding Pass signed off: yes - sent for addendum 2/17 with increased dose     IUI order pended: n/a      Additional Information:  Patient aware to start letrozole CD 3-7 after confirming a negative pregnancy test. Patient will check CD 22 p4 level on 3/10.     Heike Shah 02/17/25 4:13 PM

## 2025-02-17 NOTE — TELEPHONE ENCOUNTER
Reason for call: reporting start of cycle  LMP: 02/17  Treatment type: timed intercourse  Note: wants a call back for next steps

## 2025-03-10 NOTE — PROGRESS NOTES
34 year old patient of MD Marichuy is here for current CD22 P4 to check for ovulation. This is patients 2nd cycle.  Cycle #( LMP): 02/17/2025  Reason For Treatment: hx PID and bilateral obstructed hydrosalpinx, pelvic pain, AUB, now s/p unilateral salpingectomy   Protocol (plan):Letrozole 5mg cycle date 3-7/ TIC   vestakathleen gay 03/10/25 9:28 AM      As of now, patient has not gone to lab. Reminder call made to patient. Patient added to noon alia 03/11/2025.  vesta gay 03/10/25 3:22 PM

## 2025-03-11 NOTE — PROGRESS NOTES
34 year old patient of MD Marichuy is here for current CD22 P4 to check for ovulation. This is patients 2nd cycle.  Cycle #( LMP): 02/1Reason For Treatment: hx PID and bilateral obstructed hydrosalpinx, pelvic pain, AUB, now s/p unilateral salpingectomy   Protocol (plan):Letrozole 5mg cycle date 3-7/ TIC7/2025      03/11/25 at 8:49 AM - Kalani Donovan LPN    Patient added to noon [facility] 3/12 for CD 23 progesterone. MyChart sent to patient to notify office if she was able to go to outpatient lab.       03/11/25 at 3:21 PM - Kalani Donovan LPN

## 2025-03-12 NOTE — PROGRESS NOTES
Patient going to  lab for CD 24 P4 level following a Letrozole 5 mg TIC cycle #2. 34 year old patient of Dr. Benedict's.      Will contact patient with results and plan.         03/12/25 at 8:08 AM - Zackery Lewis RN     Patient has not gone to lab as of 1615. Will add patient to huddle 3/13 for review.       03/12/25 at 4:19 PM - Zackery Lewis RN   
5

## 2025-03-13 NOTE — PROGRESS NOTES
Patient going to  lab for CD 25 P4 level following Letrozole 5mg TIC stepped up to increase dose from previous cycle.     Will contact patient with results and plan.     Placed call to patient and left voicemail to see if patient went to lab. Patient advised since it is already cycle day 25 ovulation may not be confirmed by progesterone level this late in cycle. If patient did not go to lab already, she was advised to call with positive pregnancy test or period.       03/13/25 at 11:58 AM - Kalani Donovan LPN

## (undated) DEVICE — Device

## (undated) DEVICE — SCOPE WARMER, LAPAROSCOPE, BAG ONLY, LF

## (undated) DEVICE — SUTURE, VICRYL, 4-0, 18 IN, PS2, UNDYED

## (undated) DEVICE — COVER HANDLE LIGHT, STERIS, BLUE, STERILE

## (undated) DEVICE — PUMP, STRYKERFLOW 2 & HANDPIECE W/10FT. IRRIGATION TUBING

## (undated) DEVICE — TIP, RUMI LAVENDER 5.1MM X 6CM

## (undated) DEVICE — GLOVE, SURGICAL, PROTEXIS PI BLUE W/NEUTHERA, 7.0, PF, LF

## (undated) DEVICE — SUTURE, VICRYL, 3-0, 27 IN, SH

## (undated) DEVICE — SPONGE, BARRIER ADHESION, OXIDIZED CELLULOSE, INTERCEED, 3 X 4 IN

## (undated) DEVICE — ACCESSORY, AVETA, WASTE MANAGEMENT WITH CAP

## (undated) DEVICE — SOLIDIFIER, KWIK-SORB, 1200CC

## (undated) DEVICE — CORD, MONOPOLARD, 10FT, DISP

## (undated) DEVICE — BRIEF, CURITY, XLARGE, MESH

## (undated) DEVICE — CANNULA, KII ADVANCED FIXATION, 5X100MM W/SEAL

## (undated) DEVICE — TOWEL, SURGICAL, NEURO, O/R, 16 X 26, BLUE, STERILE

## (undated) DEVICE — PREP TRAY, VAGINAL

## (undated) DEVICE — SOLUTION, IRRIGATION, SODIUM CHLORIDE 0.9%, 1000 ML, POUR BOTTLE

## (undated) DEVICE — TUBE SET, PNEUMOLAR HEATED, SMOKE EVACU, HIGH-FLOW

## (undated) DEVICE — TRAY, FOLEY, LUBRI-SIL, 16FR, COMPLETE CARE W/STATLOCK

## (undated) DEVICE — HYSTEROSCOPE, AVETA CORAL, 4.6MM

## (undated) DEVICE — TROCAR, OPTICAL BLADELESS 5MM X 100 W/ADVANCED FIXATION

## (undated) DEVICE — ACCESSORY, FLUID MANAGEMENT, AVETA

## (undated) DEVICE — SHEAR, W/UNIPOLAR CAUTERY, ENDOSHEAR, 5 MM

## (undated) DEVICE — TROCAR, OPTICAL, BLADELESS, KII FIOS, 5 X 100MM, THREADED

## (undated) DEVICE — SYRINGE, LUER LOCK, 12ML

## (undated) DEVICE — PAD, SANITARY, OBSTETRICAL, W/ADHSV STRIP,11 IN,LF

## (undated) DEVICE — DRAPE, INSTRUMENT, W/POUCH, STERI DRAPE, 9 5/8 X 18 LONG

## (undated) DEVICE — TUBING, SUCTION, NON-CONDUCTIVE, W/CONNECT,.25 IN X 12 FT, STERILE, LF

## (undated) DEVICE — SOLUTION, SCRUB EXIDINE, 4% CHG, 4 OZ

## (undated) DEVICE — ADHESIVE, SKIN, LIQUIBAND EXCEED

## (undated) DEVICE — DRAPE PACK, LAVH, W/ATTACHED LEGGINGS, W/POUCH, 100 X 114 IN, LF, STERILE

## (undated) DEVICE — LIGASURE, V SEALER/DIVIDER  5MM BLUNT TIP